# Patient Record
Sex: FEMALE | Race: WHITE
[De-identification: names, ages, dates, MRNs, and addresses within clinical notes are randomized per-mention and may not be internally consistent; named-entity substitution may affect disease eponyms.]

---

## 2017-05-09 ENCOUNTER — HOSPITAL ENCOUNTER (OUTPATIENT)
Dept: HOSPITAL 56 - MW.DI | Age: 62
End: 2017-05-09
Attending: NURSE PRACTITIONER
Payer: COMMERCIAL

## 2017-05-09 DIAGNOSIS — R59.1: ICD-10-CM

## 2017-05-09 DIAGNOSIS — E27.8: ICD-10-CM

## 2017-05-09 DIAGNOSIS — R91.8: ICD-10-CM

## 2017-05-09 DIAGNOSIS — C34.92: Primary | ICD-10-CM

## 2017-05-09 PROCEDURE — 74177 CT ABD & PELVIS W/CONTRAST: CPT

## 2017-05-09 PROCEDURE — 71260 CT THORAX DX C+: CPT

## 2017-05-10 NOTE — CT
EXAMINATION: CT chest, abdomen, and pelvis with contrast

 

HISTORY: Malignant neoplasm

 

COMPARISON: 2/23/2017, 7/18/2016

 

TECHNIQUE: Axial CT images obtained through the chest, abdomen, and pelvis following the administrat
ion of 100 mL of Isovue-370 in the right antecubital fossa.

 

FINDINGS: 

Chest: There is likely a 1 cm nodule within the left thyroid lobe, grossly unchanged. There is a ana
cified granuloma within the right apex. There are small subtle reticular to groundglass nodules with
in the left upper lobe.  There is a new tiny nodule area likely scarring within the left lingula avril
suring 5 x 1 cm. There is a tubular shaped nodule also noted within the left lingula, grossly unchan
ged at 1.6 x 3.7 cm. No pleural effusion or pneumothorax. There is a left-sided mary catheter noted
. The heart is normal in size without a pericardial effusion. The thoracic aorta is normal in calibe
r. There are at least 2 segmental pulmonary artery noted within the right lower lobe.

 

Abdomen: Tiny hepatic hypodensities are noted. There is a cyst within the spleen. There is also a ri
ng-enhancing 1.5 cm area within the spleen possibly heterogeneity and enhancement and appears presen
t in the prior CTs. A 2.2 x 1.7 cm right adrenal nodule, stable. There is no bulky retroperitoneal l
ymphadenopathy. No abdominal ascites. The kidneys enhance and function symmetrically without evidenc
e of obstructive uropathy. The gallbladder and pancreas appear normal.

 

Pelvis: The large and small bowel are normal in caliber without evidence of obstruction. No pericolo
sang inflammation or stranding. The appendix appears normal. No bulky pelvic lymphadenopathy or free 
pelvic fluid. The urinary bladder appears normal. Uterus and ovaries are unremarkable.

 

No suspicious osseous abnormalities identified.

 

IMPRESSION: 

1. Stable 1.6 x 3.7 cm nodule within the left upper lobe.

2. Tiny reticular nodules within the left apex, grossly unchanged to slightly less prominent.

3. New 6 x 11 mm nodular area within the left lingula with increased adjacent atelectasis, likely po
stradiation changes.

4. Stable right adrenal nodule.

5. No mediastinal or hilar lymphadenopathy.

6. 2 small segmental pulmonary emboli within the right lower lobe.

## 2017-07-06 ENCOUNTER — HOSPITAL ENCOUNTER (INPATIENT)
Dept: HOSPITAL 56 - MW.MS | Age: 62
LOS: 4 days | Discharge: HOME | DRG: 469 | End: 2017-07-10
Attending: INTERNAL MEDICINE | Admitting: INTERNAL MEDICINE
Payer: COMMERCIAL

## 2017-07-06 DIAGNOSIS — R13.10: ICD-10-CM

## 2017-07-06 DIAGNOSIS — C34.90: ICD-10-CM

## 2017-07-06 DIAGNOSIS — E03.9: ICD-10-CM

## 2017-07-06 DIAGNOSIS — C79.31: ICD-10-CM

## 2017-07-06 DIAGNOSIS — Z86.711: ICD-10-CM

## 2017-07-06 DIAGNOSIS — N30.01: ICD-10-CM

## 2017-07-06 DIAGNOSIS — N17.9: Primary | ICD-10-CM

## 2017-07-06 DIAGNOSIS — Z79.899: ICD-10-CM

## 2017-07-06 DIAGNOSIS — I10: ICD-10-CM

## 2017-07-06 DIAGNOSIS — R11.10: ICD-10-CM

## 2017-07-06 DIAGNOSIS — D64.81: ICD-10-CM

## 2017-07-06 DIAGNOSIS — Z86.718: ICD-10-CM

## 2017-07-06 DIAGNOSIS — F17.200: ICD-10-CM

## 2017-07-06 LAB
CHLORIDE SERPL-SCNC: 106 MMOL/L (ref 98–110)
SODIUM SERPL-SCNC: 140 MMOL/L (ref 136–146)

## 2017-07-06 PROCEDURE — C9113 INJ PANTOPRAZOLE SODIUM, VIA: HCPCS

## 2017-07-06 RX ADMIN — CEFTRIAXONE SCH MLS/HR: 1 INJECTION, SOLUTION INTRAVENOUS at 16:15

## 2017-07-06 RX ADMIN — HEPARIN SODIUM SCH MLS/HR: 5000 INJECTION, SOLUTION INTRAVENOUS at 18:49

## 2017-07-06 RX ADMIN — SODIUM CHLORIDE SCH MLS/HR: 9 INJECTION, SOLUTION INTRAMUSCULAR; INTRAVENOUS; SUBCUTANEOUS at 14:33

## 2017-07-06 NOTE — PCM.HP
H&P History of Present Illness





- General


Date of Service: 07/06/17


Admit Problem/Dx: 


 Admission Diagnosis/Problem





Admission Diagnosis/Problem      Acute renal failure








Source of Information: Patient, Family (at bedside)


History Limitations: Reports: No Limitations





- History of Present Illness


Initial Comments - Free Text/Narative: 


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 

with recent diagnoses of PE and DVT presented to Oncology today for usual 

Opdivo infusion. She had blood work drawn yesterday in preparation for today's 

infusion. BUN/Cr were noted to be elevated 83 and 6.0. She was directly 

admitted. She reports she has been feeling overall ok the last few days. Her 

last chemotherapy was 2 weeks ago. She was transitioned to Xarelto from Lovenox 

beginning of June for PE/DVTs. She has not noticed any black or bloody bowel 

movements, no hematemesis or coffee ground emesis. She denies chest pain, SOB 

or palpitations, no fevers at home. She may not be eating or drinking as much 

as usual, but has been urinating normally. Urine is not bloody and is usually 

clear in color, not dark. She and family feels she has been at her baseline 

health recently





Todays lab reveals WBC 8.40 hgb 9.9, Hct 31, BUN 79 and Cr 5.9 FeNA 3.43. UA 

reveals few bacteria, 15-20 WBC, moderate leukocyte esterase. 








- Related Data


Allergies/Adverse Reactions: 


 Allergies











Allergy/AdvReac Type Severity Reaction Status Date / Time


 


No Known Allergies Allergy   Verified 07/09/16 18:15











Home Medications: 


 Home Meds





LORazepam 0.5 mg PO Q6HR PRN 05/04/16 [History]


Carvedilol [Carvedilol] 6.25 mg PO BID 01/09/17 [History]


Albuterol Sulfate [Proair Respiclick] 2 inh IH Q6H PRN 07/06/17 [History]


Benzonatate [Benzonatate] 200 mg PO TID PRN 07/06/17 [History]


Levothyroxine Sodium [Levothyroxine Sodium] 75 mcg PO ACBREAKFAST 07/06/17 [

History]


Magnesium Oxide 800 mg PO TID 07/06/17 [History]


Nivolumab [Opdivo] 240 mg IV Q14D 07/06/17 [History]


Omeprazole 20 mg PO ACBREAKFAST 07/06/17 [History]


Rivaroxaban [Xarelto] 20 mg PO DAILY@1900 07/06/17 [History]











Past Medical History


HEENT History: Reports: Impaired Vision


Other HEENT History: wears glasses sometimes


Cardiovascular History: Reports: Hypertension


Other Cardiovascular History: not currently taking medication


Respiratory History: Reports: Other (See Below)


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 

the brain


Gastrointestinal History: Reports: GERD.  Denies: GI Bleed


OB/GYN History: Reports: Pregnancy


Musculoskeletal History: Reports: Fracture


Other Musculoskeletal History: currently has fx left ankle, casted          has 

left side weakness


Neurological History: Reports: Vertigo, Other (See Below)


Other Neuro History: dizziness


Psychiatric History: Reports: None


Endocrine/Metabolic History: Reports: Hypothyroidism


Hematologic History: Reports: None


Other Hematologic History: son states her blood has been slow to clot recently


Immunologic History: Reports: None


Oncologic (Cancer) History: Reports: Lung


Other Oncologic History: lung cancer is metastisized to brain


Dermatologic History: Reports: None





- Infectious Disease History


Infectious Disease History: Reports: Chicken Pox, Shingles





- Past Surgical History


Head Surgeries/Procedures: Reports: None


Oncologic Surgical History: Reports: Other (See Below)





Social & Family History





- Family History


Family Medical History: Noncontributory





- Tobacco Use


Smoking Status *Q: Current Some Day Smoker


Years of Tobacco use: 40


Packs/Tins Daily: 0.2


Used Tobacco, but Quit: Yes


Second Hand Smoke Exposure: No





- Caffeine Use


Caffeine Use: Reports: Coffee





- Recreational Drug Use


Recreational Drug Use: No





- Living Situation & Occupation


Living situation: Reports: , with Family





H&P Review of Systems





- Review of Systems:


Review Of Systems: See Below


General: Reports: No Symptoms.  Denies: Fever, Chills, Malaise, Fatigue


HEENT: Reports: No Symptoms.  Denies: Headaches, Sinus Congestion, Sore Throat


Pulmonary: Reports: Cough (baseline).  Denies: Shortness of Breath


Cardiovascular: Reports: No Symptoms.  Denies: Chest Pain, Palpitations, Edema, 

Lightheadedness


Gastrointestinal: Reports: Nausea (intermittently), Vomiting.  Denies: 

Abdominal Pain, Black Stool, Bloody Stool, Hematemesis


Genitourinary: Reports: No Symptoms.  Denies: Dysuria, Frequency, Burning, 

Incontinence, Hematuria


Skin: Reports: No Symptoms


Neurological: Reports: No Symptoms


Hematologic/Lymphatic: Reports: No Symptoms


Immunologic: Reports: No Symptoms





Exam





- Exam


Exam: See Below





- Vital Signs


Vital Signs: 


 Last Vital Signs











Temp  97.4 F   07/06/17 13:26


 


Pulse  62   07/06/17 13:26


 


Resp  18   07/06/17 13:26


 


BP  110/62   07/06/17 13:26


 


Pulse Ox  92 L  07/06/17 13:26











Weight: 70.5 kg





- Exam


General: Alert, Oriented, Cooperative


HEENT: Conjunctiva Clear, Mucosa Moist & Pink, Posterior Pharynx Clear


Neck: Supple, Trachea Midline, Full Range of Motion.  No: Lymphadenopathy


Lungs: Clear to Auscultation, Normal Respiratory Effort


Cardiovascular: Regular Rate, Regular Rhythm, Normal S1, Normal S2.  No: 

Irregular Rhythm, Tachycardia, Systolic Murmur


Abdomen: Normal Bowel Sounds, Soft.  No: Distention, Guarding, Rigidity, 

Tenderness


Back Exam: Normal Inspection


Extremities: Normal Inspection, Normal Pulses.  No: Calf Tenderness, Edema


Neurological: Cranial Nerves Intact


Neuro Extensive - Mental Status: Alert, Oriented x3, Normal Mood/Affect, Normal 

Cognition


Neuro Extensive - Motor, Sensory, Reflexes: CN II-XII Intact


Psychiatric: Alert, Normal Affect, Normal Mood





- Patient Data


Lab Results Last 24 hrs: 


 Laboratory Results - last 24 hr











  07/06/17 07/06/17 07/06/17 Range/Units





  14:10 14:10 14:20 


 


WBC     (4.0-11.0)  K/uL


 


RBC     (4.30-5.90)  M/uL


 


Hgb     (12.0-16.0)  g/dL


 


Hct     (36.0-46.0)  %


 


MCV     (80.0-98.0)  fL


 


MCH     (27.0-32.0)  pg


 


MCHC     (31.0-37.0)  g/dL


 


RDW Std Deviation     (28.0-62.0)  fl


 


RDW Coeff of Castillo     (11.0-15.0)  %


 


Plt Count     (150-400)  K/uL


 


MPV     (7.40-12.00)  fL


 


Add Manual Diff     


 


Neutrophils % (Manual)     (48.0-80.0)  %


 


Band Neutrophils %     %


 


Lymphocytes % (Manual)     (16.0-40.0)  %


 


Monocytes % (Manual)     (0.0-15.0)  %


 


Eosinophils % (Manual)     (0.0-7.0)  %


 


Basophils % (Manual)     (0.0-1.5)  %


 


Nucleated RBC %     /100WBC


 


Absolute Seg Neuts     


 


Band Neutrophils #     


 


Lymphocytes # (Manual)     


 


Monocytes # (Manual)     


 


Eosinophils # (Manual)     


 


Basophils # (Manual)     


 


Nucleated RBCs #     K/uL


 


APTT     (18.6-31.3)  SEC


 


Sodium    140  (136-146)  mmol/L


 


Potassium    4.4  (3.5-5.1)  mmol/L


 


Chloride    106  ()  mmol/L


 


Carbon Dioxide    24  (21-31)  mmol/L


 


BUN    79 H  (6.0-23.0)  mg/dL


 


Creatinine    5.9 H  (0.6-1.5)  mg/dL


 


Est Cr Clr Drug Dosing    8.38  mL/min


 


Estimated GFR (MDRD)    7.2  ml/min


 


Glucose    98  ()  mg/dL


 


Calcium    9.3  (8.8-10.8)  mg/dL


 


Urine Color   YELLOW   


 


Urine Appearance   HAZY   


 


Urine pH   6.5   (5.0-8.0)  


 


Ur Specific Gravity   1.010   (1.001-1.035)  


 


Urine Protein   NEGATIVE   (NEGATIVE)  mg/dL


 


Urine Glucose (UA)   NEGATIVE   (NEGATIVE)  mg/dL


 


Urine Ketones   NEGATIVE   (NEGATIVE)  mg/dL


 


Urine Occult Blood   MODERATE   (NEGATIVE)  


 


Urine Nitrite   NEGATIVE   (NEGATIVE)  


 


Urine Bilirubin   NEGATIVE   (NEGATIVE)  


 


Urine Urobilinogen   0.2   (<2.0)  EU/dL


 


Ur Leukocyte Esterase   MODERATE   (NEGATIVE)  


 


Ur Random Creatinine  74.9    mg/dL


 


Ur Random Sodium  61.0    mmol/L














  07/06/17 07/06/17 Range/Units





  14:20 14:20 


 


WBC  8.40   (4.0-11.0)  K/uL


 


RBC  3.43 L   (4.30-5.90)  M/uL


 


Hgb  9.9 L   (12.0-16.0)  g/dL


 


Hct  31.5 L   (36.0-46.0)  %


 


MCV  91.8   (80.0-98.0)  fL


 


MCH  28.9   (27.0-32.0)  pg


 


MCHC  31.4   (31.0-37.0)  g/dL


 


RDW Std Deviation  48.2   (28.0-62.0)  fl


 


RDW Coeff of Castillo  14   (11.0-15.0)  %


 


Plt Count  382   (150-400)  K/uL


 


MPV  8.70   (7.40-12.00)  fL


 


Add Manual Diff  YES   


 


Neutrophils % (Manual)  72   (48.0-80.0)  %


 


Band Neutrophils %  3   %


 


Lymphocytes % (Manual)  16   (16.0-40.0)  %


 


Monocytes % (Manual)  2   (0.0-15.0)  %


 


Eosinophils % (Manual)  6   (0.0-7.0)  %


 


Basophils % (Manual)  1   (0.0-1.5)  %


 


Nucleated RBC %  0.0   /100WBC


 


Absolute Seg Neuts  6.0   


 


Band Neutrophils #  0.3   


 


Lymphocytes # (Manual)  1.3   


 


Monocytes # (Manual)  0.2   


 


Eosinophils # (Manual)  0.5   


 


Basophils # (Manual)  0   


 


Nucleated RBCs #  0   K/uL


 


APTT   48.1 H  (18.6-31.3)  SEC


 


Sodium    (136-146)  mmol/L


 


Potassium    (3.5-5.1)  mmol/L


 


Chloride    ()  mmol/L


 


Carbon Dioxide    (21-31)  mmol/L


 


BUN    (6.0-23.0)  mg/dL


 


Creatinine    (0.6-1.5)  mg/dL


 


Est Cr Clr Drug Dosing    mL/min


 


Estimated GFR (MDRD)    ml/min


 


Glucose    ()  mg/dL


 


Calcium    (8.8-10.8)  mg/dL


 


Urine Color    


 


Urine Appearance    


 


Urine pH    (5.0-8.0)  


 


Ur Specific Gravity    (1.001-1.035)  


 


Urine Protein    (NEGATIVE)  mg/dL


 


Urine Glucose (UA)    (NEGATIVE)  mg/dL


 


Urine Ketones    (NEGATIVE)  mg/dL


 


Urine Occult Blood    (NEGATIVE)  


 


Urine Nitrite    (NEGATIVE)  


 


Urine Bilirubin    (NEGATIVE)  


 


Urine Urobilinogen    (<2.0)  EU/dL


 


Ur Leukocyte Esterase    (NEGATIVE)  


 


Ur Random Creatinine    mg/dL


 


Ur Random Sodium    mmol/L











Result Diagrams: 


 07/06/17 14:20





 07/06/17 14:20





*Q Meaningful Use (ADM)





- VTE *Q


VTE Criteria *Q: 








- VTE Risk Assess *Q


Each Risk Factor Represents 1 Point: None


Total Score 1 Point Risk Factors: 0


Each Risk Factor Represents 2 Points: None


Total Score 2 Point Risk Factors: 0


Each Risk Factor Represents 3 Points: History Superficial Venous Thrombosis, 

DVT or PE, Present Cancer or Chemotherapy


Total Score 3 Point Risk Factors: 6


Each Risk Factor Represents 5 Points: None


Total Score 5 Point Risk Factors: 0


Venous Thromboembolism Risk Factor Score *Q: 6





- Stroke *Q


Stroke Criteria *Q: 








- AMI *Q


AMI Criteria *Q: 








- Problem List


(1) Acute renal injury


SNOMED Code(s): 03815980


   ICD Code: N17.9 - ACUTE KIDNEY FAILURE, UNSPECIFIED   Status: Acute   

Current Visit: Yes   





(2) UTI (urinary tract infection)


SNOMED Code(s): 11940909


   ICD Code: N39.0 - URINARY TRACT INFECTION, SITE NOT SPECIFIED   Status: 

Acute   Current Visit: Yes   


Qualifiers: 


   Urinary tract infection type: acute cystitis   Hematuria presence: with 

hematuria   Qualified Code(s): N30.01 - Acute cystitis with hematuria   





(3) Small cell lung cancer


SNOMED Code(s): 533263516


   ICD Code: C34.90 - MALIGNANT NEOPLASM OF UNSP PART OF UNSP BRONCHUS OR LUNG 

  Status: Chronic   Current Visit: Yes   


Qualifiers: 


   Laterality: unspecified laterality   Qualified Code(s): C34.90 - Malignant 

neoplasm of unspecified part of unspecified bronchus or lung   





(4) Hx pulmonary embolism


SNOMED Code(s): 633696261


   ICD Code: Z86.711 - PERSONAL HISTORY OF PULMONARY EMBOLISM   Status: Chronic

   Current Visit: Yes   





(5) Hx of deep venous thrombosis


SNOMED Code(s): 198345142


   ICD Code: Z86.718 - PERSONAL HISTORY OF OTHER VENOUS THROMBOSIS AND EMBOLISM

   Status: Chronic   Current Visit: Yes   


Problem List Initiated/Reviewed/Updated: Yes


Orders Last 24hrs: 


 Active Orders 24 hr











 Category Date Time Status


 


 Patient Status [ADT] Routine ADT  07/06/17 13:26 Active


 


 Cardiac Monitoring [RC] Q8H Care  07/06/17 13:26 Active


 


 Insert Urinary Catheter [OM.PC] Q24H Care  07/06/17 13:30 Ordered


 


 Intake and Output Strict [RC] Q12H Care  07/06/17 13:26 Active


 


 Oxygen Therapy [RC] PRN Care  07/06/17 13:26 Active


 


 Telemetry Monitoring [Cardiac Monitoring] [RC] .AS Care  07/06/17 15:10 Active





 DIRECTED   


 


 Up With Assistance [RC] ASDIRECTED Care  07/06/17 13:26 Active


 


 Urinary Catheter Assessment [RC] ASDIRECTED Care  07/06/17 13:33 Active


 


 VTE/DVT Education [RC] PER UNIT ROUTINE Care  07/06/17 13:26 Active


 


 Vital Signs [RC] Q4H Care  07/06/17 13:26 Active


 


 Regular Diet [DIET] Diet  07/06/17 Dinner Active


 


 Retroperitoneal Comp [US] Routine Exams  07/06/17 13:26 Ordered


 


 BMP [BASIC METABOLIC PANEL,BMP] [CHEM] DAILY Lab  07/07/17 05:00 Ordered


 


 BMP [BASIC METABOLIC PANEL,BMP] [CHEM] DAILY Lab  07/08/17 05:00 Ordered


 


 BMP [BASIC METABOLIC PANEL,BMP] [CHEM] DAILY Lab  07/09/17 05:00 Ordered


 


 CBC WITH AUTO DIFF [HEME] DAILY Lab  07/07/17 05:00 Ordered


 


 CBC WITH AUTO DIFF [HEME] DAILY Lab  07/08/17 05:00 Ordered


 


 CBC WITH AUTO DIFF [HEME] DAILY Lab  07/09/17 05:00 Ordered


 


 PTT,PARTIAL THROMBOPLSTIN TIME [COAG] Q6H Lab  07/07/17 01:00 Ordered


 


 PTT,PARTIAL THROMBOPLSTIN TIME [COAG] Q6H Lab  07/07/17 07:00 Ordered


 


 PTT,PARTIAL THROMBOPLSTIN TIME [COAG] Q6H Lab  07/07/17 13:00 Ordered


 


 PTT,PARTIAL THROMBOPLSTIN TIME [COAG] Q6H Lab  07/07/17 19:00 Ordered


 


 PTT,PARTIAL THROMBOPLSTIN TIME [COAG] Q6H Lab  07/08/17 01:00 Ordered


 


 PTT,PARTIAL THROMBOPLSTIN TIME [COAG] Q6H Lab  07/08/17 07:00 Ordered


 


 UA W/MICROSCOPIC [URIN] Routine Lab  07/06/17 14:10 Results


 


 Heparin Sod,Pork In 0.45% Nacl [Heparin-1/2Ns 25,000 Med  07/06/17 19:00 Active





 Units/500]   





 25,000 unit in 500 ml IV TITRATE   


 


 Heparin Sodium Med  07/07/17 01:00 Active





 0 units IVPUSH Q6H PRN   


 


 Pantoprazole [ProTONIX IV***] 40 mg Med  07/06/17 13:30 Active





 Sodium Chloride 0.9% [Normal Saline] 10 ml   





 IVPUSH DAILY   


 


 Sodium Chloride 0.9% [Normal Saline] 1,000 ml Med  07/06/17 13:30 Active





 IV .BOLUS   


 


 Sodium Chloride 0.9% [Normal Saline] 1,000 ml Med  07/06/17 15:45 Ordered





 IV ASDIRECTED   


 


 Resuscitation Status Routine Resus Stat  07/06/17 15:10 Ordered








 Medication Orders





Heparin Sodium (Porcine) (Heparin Sodium)  0 units IVPUSH Q6H PRN


   PRN Reason: APPT RESULTS


Pantoprazole Sodium 40 mg/ (Sodium Chloride)  10 mls @ 300 mls/hr IVPUSH DAILY 

ZULY


   Last Admin: 07/06/17 14:33  Dose: 300 mls/hr


Sodium Chloride (Normal Saline)  1,000 mls @ 999 mls/hr IV .BOLUS ZULY


Heparin Sod,Pork In 0.45% Nacl (Heparin-1/2ns 25,000 Units/500)  25,000 unit in 

500 mls @ 25.2 mls/hr IV TITRATE ZULY; 18 UNITS/KG/HR


   PRN Reason: Protocol


Sodium Chloride (Normal Saline)  1,000 mls @ 125 mls/hr IV ASDIRECTED ZULY








Assessment/Plan Comment:: 


This 62 year old female admitted with KYLE and UTI 





1. KYLE: ATN vs exposure to nephrotoxic chemo agent, Opdivo?? FeNA 3.43. Have 

given 2 L NS bolus on admit, will continue  overnight, check BMP in am. 

Monitor strict I/O with indwelling catheter. 





2. UTI: UC pending, will place on Rocephin for now. Monitor.





3. HX DVT/PE: Will hold Xarelto due to renal injury. Place on Heparin gtt. 

Monitor PTTs.





4. 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

## 2017-07-06 NOTE — US
EXAMINATION: Renal ultrasound

 

HISTORY: Acute renal injury

 

COMPARISON: CT dated 5/9/2017 and CT dated 7/18/2016

 

TECHNIQUE: Grayscale and color Doppler images obtained of the kidneys and bladder. 

 

FINDINGS: The right kidney measures at least 11.6 cm and the left kidney measures at least 12 cm arron
e-to-pole without evidence of hydronephrosis. The renal cortical echotexture appears increased bilat
erally. There is a mildly complex 1.2 cm cyst within the right kidney which appears stable on compar
mary CTs to at least July 2016. Normal color Doppler flow bilaterally. Incidental gallstones are not
ed within the gallbladder.

 

There is a Carbone catheter within the bladder

 

IMPRESSION: 

1. Increased renal cortical echotexture, correlate for a medical renal disease.

2. 1.2 cm mildly complex cyst within the right kidney. Stable in comparison to previous CTs, conside
r 6 month one year follow-up.

3. Cholelithiasis.

## 2017-07-07 LAB
CHLORIDE SERPL-SCNC: 115 MMOL/L (ref 98–110)
SODIUM SERPL-SCNC: 143 MMOL/L (ref 136–146)

## 2017-07-07 RX ADMIN — SODIUM CHLORIDE SCH MLS/HR: 9 INJECTION, SOLUTION INTRAMUSCULAR; INTRAVENOUS; SUBCUTANEOUS at 09:30

## 2017-07-07 RX ADMIN — CEFTRIAXONE SCH MLS/HR: 1 INJECTION, SOLUTION INTRAVENOUS at 15:15

## 2017-07-07 RX ADMIN — HEPARIN SODIUM SCH MLS/HR: 5000 INJECTION, SOLUTION INTRAVENOUS at 19:02

## 2017-07-07 NOTE — PCM.PN
- General Info


Date of Service: 07/07/17


Admission Dx/Problem (Free Text): 


 Admission Diagnosis/Problem





Admission Diagnosis/Problem      Acute renal failure








Subjective Update: 


Feeling good today, no complaints. Still having issues will swallowing and then 

vomiting intermittently, mainly with more solid foods. No chest pain or SOB, no 

palpitations. 





Functional Status: Reports: pain controlled, tolerating diet, ambulating





- Review of Systems


General: Reports: No Symptoms


HEENT: Reports: other (trouble keeping solid foods down, "its like it reaches a 

point and then comes back up").  Denies: sore throat


Pulmonary: Denies: shortness of breath


Cardiovascular: Reports: No Symptoms.  Denies: Chest Pain, Edema


Gastrointestinal: Reports: No symptoms.  Denies: Abdominal pain, Nausea, 

Vomiting


Genitourinary: Reports: no symptoms


Musculoskeletal: Reports: no symptoms


Neurological: Reports: No Symptoms


Psychiatric: Reports: no symptoms





- Patient Data


Vitals - most recent: 


 Last Vital Signs











Temp  97.6 F   07/07/17 04:00


 


Pulse  66   07/07/17 04:00


 


Resp  16   07/07/17 04:00


 


BP  90/56 L  07/07/17 04:00


 


Pulse Ox  95   07/07/17 04:00











Weight - most recent: 70.5 kg


I&O - last 24 hours: 


 Intake & Output











 07/06/17 07/07/17 07/07/17





 22:59 06:59 14:59


 


Intake Total 2528 1450 


 


Output Total 250 1750 


 


Balance 2278 -300 











Lab Results last 24 hrs: 


 Laboratory Results - last 24 hr











  07/06/17 07/06/17 07/06/17 Range/Units





  14:10 14:10 14:20 


 


WBC     (4.0-11.0)  K/uL


 


RBC     (4.30-5.90)  M/uL


 


Hgb     (12.0-16.0)  g/dL


 


Hct     (36.0-46.0)  %


 


MCV     (80.0-98.0)  fL


 


MCH     (27.0-32.0)  pg


 


MCHC     (31.0-37.0)  g/dL


 


RDW Std Deviation     (28.0-62.0)  fl


 


RDW Coeff of Castillo     (11.0-15.0)  %


 


Plt Count     (150-400)  K/uL


 


MPV     (7.40-12.00)  fL


 


Add Manual Diff     


 


Neutrophils % (Manual)     (48.0-80.0)  %


 


Band Neutrophils %     %


 


Lymphocytes % (Manual)     (16.0-40.0)  %


 


Monocytes % (Manual)     (0.0-15.0)  %


 


Eosinophils % (Manual)     (0.0-7.0)  %


 


Basophils % (Manual)     (0.0-1.5)  %


 


Nucleated RBC %     /100WBC


 


Absolute Seg Neuts     


 


Band Neutrophils #     


 


Lymphocytes # (Manual)     


 


Monocytes # (Manual)     


 


Eosinophils # (Manual)     


 


Basophils # (Manual)     


 


Nucleated RBCs #     K/uL


 


APTT     (18.6-31.3)  SEC


 


Sodium    140  (136-146)  mmol/L


 


Potassium    4.4  (3.5-5.1)  mmol/L


 


Chloride    106  ()  mmol/L


 


Carbon Dioxide    24  (21-31)  mmol/L


 


BUN    79 H  (6.0-23.0)  mg/dL


 


Creatinine    5.9 H  (0.6-1.5)  mg/dL


 


Est Cr Clr Drug Dosing    8.38  mL/min


 


Estimated GFR (MDRD)    7.2  ml/min


 


Glucose    98  ()  mg/dL


 


Calcium    9.3  (8.8-10.8)  mg/dL


 


Urine Color   YELLOW   


 


Urine Appearance   HAZY   


 


Urine pH   6.5   (5.0-8.0)  


 


Ur Specific Gravity   1.010   (1.001-1.035)  


 


Urine Protein   NEGATIVE   (NEGATIVE)  mg/dL


 


Urine Glucose (UA)   NEGATIVE   (NEGATIVE)  mg/dL


 


Urine Ketones   NEGATIVE   (NEGATIVE)  mg/dL


 


Urine Occult Blood   MODERATE   (NEGATIVE)  


 


Urine Nitrite   NEGATIVE   (NEGATIVE)  


 


Urine Bilirubin   NEGATIVE   (NEGATIVE)  


 


Urine Urobilinogen   0.2   (<2.0)  EU/dL


 


Ur Leukocyte Esterase   MODERATE   (NEGATIVE)  


 


Urine RBC   2-4   (0-2/HPF)  


 


Urine WBC   15-20   (0-5/HPF)  


 


Ur Epithelial Cells   FEW   (NONE-FEW)  


 


Urine Bacteria   FEW   (NEGATIVE)  


 


Ur Random Creatinine  74.9    mg/dL


 


Ur Random Sodium  61.0    mmol/L














  07/06/17 07/06/17 07/07/17 Range/Units





  14:20 14:20 00:50 


 


WBC  8.40    (4.0-11.0)  K/uL


 


RBC  3.43 L    (4.30-5.90)  M/uL


 


Hgb  9.9 L    (12.0-16.0)  g/dL


 


Hct  31.5 L    (36.0-46.0)  %


 


MCV  91.8    (80.0-98.0)  fL


 


MCH  28.9    (27.0-32.0)  pg


 


MCHC  31.4    (31.0-37.0)  g/dL


 


RDW Std Deviation  48.2    (28.0-62.0)  fl


 


RDW Coeff of Castillo  14    (11.0-15.0)  %


 


Plt Count  382    (150-400)  K/uL


 


MPV  8.70    (7.40-12.00)  fL


 


Add Manual Diff  YES    


 


Neutrophils % (Manual)  72    (48.0-80.0)  %


 


Band Neutrophils %  3    %


 


Lymphocytes % (Manual)  16    (16.0-40.0)  %


 


Monocytes % (Manual)  2    (0.0-15.0)  %


 


Eosinophils % (Manual)  6    (0.0-7.0)  %


 


Basophils % (Manual)  1    (0.0-1.5)  %


 


Nucleated RBC %  0.0    /100WBC


 


Absolute Seg Neuts  6.0    


 


Band Neutrophils #  0.3    


 


Lymphocytes # (Manual)  1.3    


 


Monocytes # (Manual)  0.2    


 


Eosinophils # (Manual)  0.5    


 


Basophils # (Manual)  0    


 


Nucleated RBCs #  0    K/uL


 


APTT   48.1 H  105.9 H  (18.6-31.3)  SEC


 


Sodium     (136-146)  mmol/L


 


Potassium     (3.5-5.1)  mmol/L


 


Chloride     ()  mmol/L


 


Carbon Dioxide     (21-31)  mmol/L


 


BUN     (6.0-23.0)  mg/dL


 


Creatinine     (0.6-1.5)  mg/dL


 


Est Cr Clr Drug Dosing     mL/min


 


Estimated GFR (MDRD)     ml/min


 


Glucose     ()  mg/dL


 


Calcium     (8.8-10.8)  mg/dL


 


Urine Color     


 


Urine Appearance     


 


Urine pH     (5.0-8.0)  


 


Ur Specific Gravity     (1.001-1.035)  


 


Urine Protein     (NEGATIVE)  mg/dL


 


Urine Glucose (UA)     (NEGATIVE)  mg/dL


 


Urine Ketones     (NEGATIVE)  mg/dL


 


Urine Occult Blood     (NEGATIVE)  


 


Urine Nitrite     (NEGATIVE)  


 


Urine Bilirubin     (NEGATIVE)  


 


Urine Urobilinogen     (<2.0)  EU/dL


 


Ur Leukocyte Esterase     (NEGATIVE)  


 


Urine RBC     (0-2/HPF)  


 


Urine WBC     (0-5/HPF)  


 


Ur Epithelial Cells     (NONE-FEW)  


 


Urine Bacteria     (NEGATIVE)  


 


Ur Random Creatinine     mg/dL


 


Ur Random Sodium     mmol/L














  07/07/17 07/07/17 Range/Units





  07:18 07:18 


 


WBC   5.69  (4.0-11.0)  K/uL


 


RBC   2.99 L  (4.30-5.90)  M/uL


 


Hgb   8.7 L  (12.0-16.0)  g/dL


 


Hct   27.5 L  (36.0-46.0)  %


 


MCV   92.0  (80.0-98.0)  fL


 


MCH   29.1  (27.0-32.0)  pg


 


MCHC   31.6  (31.0-37.0)  g/dL


 


RDW Std Deviation   47.7  (28.0-62.0)  fl


 


RDW Coeff of Castillo   14  (11.0-15.0)  %


 


Plt Count   278  (150-400)  K/uL


 


MPV   8.30  (7.40-12.00)  fL


 


Add Manual Diff   YES  


 


Neutrophils % (Manual)   62  (48.0-80.0)  %


 


Band Neutrophils %   3  %


 


Lymphocytes % (Manual)   14 L  (16.0-40.0)  %


 


Monocytes % (Manual)   13  (0.0-15.0)  %


 


Eosinophils % (Manual)   8 H  (0.0-7.0)  %


 


Basophils % (Manual)    (0.0-1.5)  %


 


Nucleated RBC %   0.0  /100WBC


 


Absolute Seg Neuts   3.5  


 


Band Neutrophils #   0.2  


 


Lymphocytes # (Manual)   0.8  


 


Monocytes # (Manual)   0.7  


 


Eosinophils # (Manual)   0.5  


 


Basophils # (Manual)    


 


Nucleated RBCs #   0  K/uL


 


APTT  130.5 H   (18.6-31.3)  SEC


 


Sodium    (136-146)  mmol/L


 


Potassium    (3.5-5.1)  mmol/L


 


Chloride    ()  mmol/L


 


Carbon Dioxide    (21-31)  mmol/L


 


BUN    (6.0-23.0)  mg/dL


 


Creatinine    (0.6-1.5)  mg/dL


 


Est Cr Clr Drug Dosing    mL/min


 


Estimated GFR (MDRD)    ml/min


 


Glucose    ()  mg/dL


 


Calcium    (8.8-10.8)  mg/dL


 


Urine Color    


 


Urine Appearance    


 


Urine pH    (5.0-8.0)  


 


Ur Specific Gravity    (1.001-1.035)  


 


Urine Protein    (NEGATIVE)  mg/dL


 


Urine Glucose (UA)    (NEGATIVE)  mg/dL


 


Urine Ketones    (NEGATIVE)  mg/dL


 


Urine Occult Blood    (NEGATIVE)  


 


Urine Nitrite    (NEGATIVE)  


 


Urine Bilirubin    (NEGATIVE)  


 


Urine Urobilinogen    (<2.0)  EU/dL


 


Ur Leukocyte Esterase    (NEGATIVE)  


 


Urine RBC    (0-2/HPF)  


 


Urine WBC    (0-5/HPF)  


 


Ur Epithelial Cells    (NONE-FEW)  


 


Urine Bacteria    (NEGATIVE)  


 


Ur Random Creatinine    mg/dL


 


Ur Random Sodium    mmol/L











Med Orders - Current: 


 Current Medications





Albuterol (Ventolin Hfa)  8 gm INH Q6H PRN


   PRN Reason: Shortness of Breath


Albuterol/Ipratropium (Duoneb 3.0-0.5 Mg/3 Ml)  3 ml NEB Q4HRRT PRN


   PRN Reason: SOB/wheezing


Benzonatate (Tessalon Perles)  200 mg PO TID PRN


   PRN Reason: Cough


Heparin Sodium (Porcine) (Heparin Sodium)  0 units IVPUSH Q6H PRN


   PRN Reason: APPT RESULTS


Pantoprazole Sodium 40 mg/ (Sodium Chloride)  10 mls @ 300 mls/hr IVPUSH DAILY 

ZULY


   Last Admin: 07/06/17 14:33 Dose:  300 mls/hr


Sodium Chloride (Normal Saline)  1,000 mls @ 999 mls/hr IV .BOLUS ZULY


   Last Admin: 07/06/17 15:54 Dose:  999 mls/hr


Heparin Sod,Pork In 0.45% Nacl (Heparin-1/2ns 25,000 Units/500)  25,000 unit in 

500 mls @ 25.2 mls/hr IV TITRATE ZULY; 18 UNITS/KG/HR


   PRN Reason: Protocol


   Last Titration: 07/07/17 02:20 Dose:  15 units/kg/hr, 21 mls/hr


Sodium Chloride (Normal Saline)  1,000 mls @ 125 mls/hr IV ASDIRECTED ZULY


   Last Admin: 07/07/17 01:48 Dose:  125 mls/hr


Ceftriaxone Sodium/Dextrose 1 (gm/ Premix)  50 mls @ 100 mls/hr IV Q24H ZULY


   Last Admin: 07/06/17 16:15 Dose:  100 mls/hr


Levothyroxine Sodium (Levothyroxine)  75 mcg PO ACBREAKFAST ZULY


   Last Admin: 07/07/17 06:54 Dose:  75 mcg


Lorazepam (Ativan)  0.5 mg PO Q6HR PRN


   PRN Reason: Anxiety





Discontinued Medications





Sodium Chloride (Normal Saline)  1,000 mls @ 999 mls/hr IV .Bolus ONE


   Stop: 07/06/17 14:38


   Last Admin: 07/06/17 13:46 Dose:  999 mls/hr











- Exam


Quality Assessment: urine catheter, DVT prophylaxis


General: alert, oriented, cooperative, no acute distress


Neck: supple


Lungs: Clear to auscultation, Normal respiratory effort


Cardiovascular: Regular Rate, Regular Rhythm


Abdomen: bowel sounds present, soft, no tenderness, no distension


Extremities: no edema, normal pulses


Neurological: no new focal deficit


Psy/Mental Status: alert, normal affect, normal mood





- Problem List & Annotations


(1) Acute renal injury


SNOMED Code(s): 27131547


   Code(s): N17.9 - ACUTE KIDNEY FAILURE, UNSPECIFIED   Status: Acute   Current 

Visit: Yes   





(2) UTI (urinary tract infection)


SNOMED Code(s): 66126083


   Code(s): N39.0 - URINARY TRACT INFECTION, SITE NOT SPECIFIED   Status: Acute

   Current Visit: Yes   


Qualifiers: 


   Urinary tract infection type: acute cystitis   Hematuria presence: with 

hematuria   Qualified Code(s): N30.01 - Acute cystitis with hematuria   





(3) Small cell lung cancer


SNOMED Code(s): 326729012


   Code(s): C34.90 - MALIGNANT NEOPLASM OF UNSP PART OF UNSP BRONCHUS OR LUNG   

Status: Chronic   Current Visit: Yes   


Qualifiers: 


   Laterality: unspecified laterality   Qualified Code(s): C34.90 - Malignant 

neoplasm of unspecified part of unspecified bronchus or lung   





(4) Hx pulmonary embolism


SNOMED Code(s): 318651805


   Code(s): Z86.711 - PERSONAL HISTORY OF PULMONARY EMBOLISM   Status: Chronic 

  Current Visit: Yes   





(5) Hx of deep venous thrombosis


SNOMED Code(s): 728446555


   Code(s): Z86.718 - PERSONAL HISTORY OF OTHER VENOUS THROMBOSIS AND EMBOLISM 

  Status: Chronic   Current Visit: Yes   





- Problem List Review


Problem List Initiated/Reviewed/Updated: Yes





- My Orders


Last 24 Hours: 


My Active Orders





07/06/17 13:26


Patient Status [ADT] Routine 


Cardiac Monitoring [RC] Q8H 


Intake and Output Strict [RC] Q12H 


Oxygen Therapy [RC] PRN 


Up With Assistance [RC] ASDIRECTED 


VTE/DVT Education [RC] PER UNIT ROUTINE 


Vital Signs [RC] Q4H 





07/06/17 13:30


Insert Urinary Catheter [OM.PC] Q24H 


Pantoprazole [ProTONIX IV***] 40 mg   Sodium Chloride 0.9% [Normal Saline] 10 

ml IVPUSH DAILY 


Sodium Chloride 0.9% [Normal Saline] 1,000 ml IV .BOLUS 





07/06/17 13:33


Urinary Catheter Assessment [RC] ASDIRECTED 





07/06/17 14:10


CULTURE URINE [RM] Routine 





07/06/17 15:10


Telemetry Monitoring [Cardiac Monitoring] [RC] .AS DIRECTED 


Resuscitation Status Routine 





07/06/17 15:45


Sodium Chloride 0.9% [Normal Saline] 1,000 ml IV ASDIRECTED 





07/06/17 16:00


cefTRIAXone [Rocephin in Dextrose,Iso-Osm 1 GM/50 ML] 1 gm   Premix Bag 1 bag 

IV Q24H 





07/06/17 16:05


Albuterol [Ventolin HFA]   8 gm INH Q6H PRN 


Benzonatate [Tessalon Perles]   200 mg PO TID PRN 


LORazepam [Ativan]   0.5 mg PO Q6HR PRN 





07/06/17 16:08


RT Aerosol Therapy [RC] ASDIRECTED 


Albuterol/Ipratropium [DuoNeb 3.0-0.5 MG/3 ML]   3 ml NEB Q4HRRT PRN 





07/06/17 19:00


Heparin Sod,Pork In 0.45% Nacl [Heparin-1/2Ns 25,000 Units/500] 25,000 unit in 

500 ml IV TITRATE 





07/06/17 Dinner


Regular Diet [DIET] 





07/07/17 01:00


Heparin Sodium   0 units IVPUSH Q6H PRN 





07/07/17 07:18


BMP [BASIC METABOLIC PANEL,BMP] [CHEM] DAILY 





07/07/17 07:30


Levothyroxine   75 mcg PO ACBREAKFAST 





07/07/17 13:00


PTT,PARTIAL THROMBOPLSTIN TIME [COAG] Q6H 





07/07/17 19:00


PTT,PARTIAL THROMBOPLSTIN TIME [COAG] Q6H 





07/08/17 01:00


PTT,PARTIAL THROMBOPLSTIN TIME [COAG] Q6H 





07/08/17 05:00


BMP [BASIC METABOLIC PANEL,BMP] [CHEM] DAILY 


CBC WITH AUTO DIFF [HEME] DAILY 





07/08/17 07:00


PTT,PARTIAL THROMBOPLSTIN TIME [COAG] Q6H 





07/09/17 05:00


BMP [BASIC METABOLIC PANEL,BMP] [CHEM] DAILY 


CBC WITH AUTO DIFF [HEME] DAILY 














- Plan


Plan:: 


This 62 year old female admitted with KYLE and UTI 





1. KYLE: ATN vs exposure to nephrotoxic chemo agent, Opdivo, paired with 

Xarelto.BUN CR improving today Continue  overnight, check BMP in am. 

Monitor strict I/O with indwelling catheter. 





2. UTI: UC pending, will place on Rocephin for now. Monitor.





3. HX DVT/PE: Will hold Xarelto due to renal injury. Place on Heparin gtt. 

Monitor PTTs.





4. 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 





5. Dysphagia: Vomiting up solid foods intermittently. Dr. Hammond recommended 

EGD, will arrange outpatient appointment with General Surgery.





VTE: Heparin gtt.





Dispo: 2-3 days.

## 2017-07-08 LAB
CHLORIDE SERPL-SCNC: 118 MMOL/L (ref 98–110)
SODIUM SERPL-SCNC: 145 MMOL/L (ref 136–146)

## 2017-07-08 RX ADMIN — CEFTRIAXONE SCH MLS/HR: 1 INJECTION, SOLUTION INTRAVENOUS at 15:40

## 2017-07-08 RX ADMIN — SODIUM CHLORIDE SCH MLS/HR: 9 INJECTION, SOLUTION INTRAMUSCULAR; INTRAVENOUS; SUBCUTANEOUS at 09:03

## 2017-07-08 RX ADMIN — HEPARIN SODIUM SCH MLS/HR: 5000 INJECTION, SOLUTION INTRAVENOUS at 18:07

## 2017-07-08 NOTE — PCM.PN
- General Info


Date of Service: 07/08/17


Admission Dx/Problem (Free Text): 


 Admission Diagnosis/Problem





Admission Diagnosis/Problem      Acute renal failure








Subjective Update: 


Feeling good today, no complaints. Still having issues will swallowing and then 

vomiting intermittently, mainly with more solid foods. No chest pain or SOB, no 

palpitations. 





Functional Status: Reports: pain controlled, tolerating diet





- Review of Systems


General: Reports: Weakness, Fatigue


HEENT: Reports: no symptoms


Pulmonary: Reports: shortness of breath


Cardiovascular: Reports: No Symptoms


Gastrointestinal: Reports: No symptoms


Genitourinary: Reports: no symptoms


Musculoskeletal: Reports: no symptoms


Skin: Reports: no symptoms


Neurological: Reports: No Symptoms


Psychiatric: Reports: no symptoms





- Patient Data


Vitals - most recent: 


 Last Vital Signs











Temp  36.6 C   07/08/17 12:00


 


Pulse  63   07/08/17 12:00


 


Resp  20   07/08/17 12:00


 


BP  138/80   07/08/17 12:00


 


Pulse Ox  95   07/08/17 13:00











Weight - most recent: 70.5 kg


I&O - last 24 hours: 


 Intake & Output











 07/08/17 07/08/17 07/08/17





 06:59 14:59 22:59


 


Intake Total 1477 1863 779


 


Output Total 2181 5115 1050


 


Balance -708 518 -246











Lab Results last 24 hrs: 


 Laboratory Results - last 24 hr











  07/07/17 07/08/17 07/08/17 Range/Units





  19:05 01:00 06:47 


 


WBC     (4.0-11.0)  K/uL


 


RBC     (4.30-5.90)  M/uL


 


Hgb     (12.0-16.0)  g/dL


 


Hct     (36.0-46.0)  %


 


MCV     (80.0-98.0)  fL


 


MCH     (27.0-32.0)  pg


 


MCHC     (31.0-37.0)  g/dL


 


RDW Std Deviation     (28.0-62.0)  fl


 


RDW Coeff of Castillo     (11.0-15.0)  %


 


Plt Count     (150-400)  K/uL


 


MPV     (7.40-12.00)  fL


 


Neut % (Auto)     (48.0-80.0)  %


 


Lymph % (Auto)     (16.0-40.0)  %


 


Mono % (Auto)     (0.0-15.0)  %


 


Eos % (Auto)     (0.0-7.0)  %


 


Baso % (Auto)     (0.0-1.5)  %


 


Neut # (Auto)     (1.4-5.7)  K/uL


 


Lymph # (Auto)     (0.6-2.4)  K/uL


 


Mono # (Auto)     (0.0-0.8)  K/uL


 


Eos # (Auto)     (0.0-0.7)  K/uL


 


Baso # (Auto)     (0.0-0.1)  K/uL


 


Nucleated RBC %     /100WBC


 


Nucleated RBCs #     K/uL


 


APTT  72.3 H  54.4 H  48.0 H  (18.6-31.3)  SEC


 


Sodium     (136-146)  mmol/L


 


Potassium     (3.5-5.1)  mmol/L


 


Chloride     ()  mmol/L


 


Carbon Dioxide     (21-31)  mmol/L


 


BUN     (6.0-23.0)  mg/dL


 


Creatinine     (0.6-1.5)  mg/dL


 


Est Cr Clr Drug Dosing     mL/min


 


Estimated GFR (MDRD)     ml/min


 


Glucose     ()  mg/dL


 


Calcium     (8.8-10.8)  mg/dL














  07/08/17 07/08/17 07/08/17 Range/Units





  06:47 06:47 13:16 


 


WBC  4.25    (4.0-11.0)  K/uL


 


RBC  2.79 L    (4.30-5.90)  M/uL


 


Hgb  8.2 L    (12.0-16.0)  g/dL


 


Hct  25.9 L    (36.0-46.0)  %


 


MCV  92.8    (80.0-98.0)  fL


 


MCH  29.4    (27.0-32.0)  pg


 


MCHC  31.7    (31.0-37.0)  g/dL


 


RDW Std Deviation  49.4    (28.0-62.0)  fl


 


RDW Coeff of Castillo  14    (11.0-15.0)  %


 


Plt Count  258    (150-400)  K/uL


 


MPV  8.30    (7.40-12.00)  fL


 


Neut % (Auto)  59.4    (48.0-80.0)  %


 


Lymph % (Auto)  17.9    (16.0-40.0)  %


 


Mono % (Auto)  13.6    (0.0-15.0)  %


 


Eos % (Auto)  8.2 H    (0.0-7.0)  %


 


Baso % (Auto)  0.9    (0.0-1.5)  %


 


Neut # (Auto)  2.5    (1.4-5.7)  K/uL


 


Lymph # (Auto)  0.8    (0.6-2.4)  K/uL


 


Mono # (Auto)  0.6    (0.0-0.8)  K/uL


 


Eos # (Auto)  0.4    (0.0-0.7)  K/uL


 


Baso # (Auto)  0.0    (0.0-0.1)  K/uL


 


Nucleated RBC %  0.0    /100WBC


 


Nucleated RBCs #  0    K/uL


 


APTT    70.2 H  (18.6-31.3)  SEC


 


Sodium   145   (136-146)  mmol/L


 


Potassium   3.4 L   (3.5-5.1)  mmol/L


 


Chloride   118 H   ()  mmol/L


 


Carbon Dioxide   18 L   (21-31)  mmol/L


 


BUN   45 H   (6.0-23.0)  mg/dL


 


Creatinine   4.0 H   (0.6-1.5)  mg/dL


 


Est Cr Clr Drug Dosing   12.36   mL/min


 


Estimated GFR (MDRD)   11.3   ml/min


 


Glucose   82   ()  mg/dL


 


Calcium   8.8   (8.8-10.8)  mg/dL











Pablo Results last 24 hrs: 


 Microbiology











 07/06/17 14:10 Urine Culture - Final





 Urine, Carbone Cath (Indwelling)    No Growth











Med Orders - Current: 


 Current Medications





Albuterol (Ventolin Hfa)  8 gm INH Q6H PRN


   PRN Reason: Shortness of Breath


Albuterol/Ipratropium (Duoneb 3.0-0.5 Mg/3 Ml)  3 ml NEB Q4HRRT PRN


   PRN Reason: SOB/wheezing


Benzonatate (Tessalon Perles)  200 mg PO TID PRN


   PRN Reason: Cough


Heparin Sodium (Porcine) (Heparin Sodium)  0 units IVPUSH Q6H PRN


   PRN Reason: APPT RESULTS


Pantoprazole Sodium 40 mg/ (Sodium Chloride)  10 mls @ 300 mls/hr IVPUSH DAILY 

ZULY


   Last Admin: 07/08/17 09:03 Dose:  300 mls/hr


Sodium Chloride (Normal Saline)  1,000 mls @ 999 mls/hr IV .BOLUS [facility]


   Last Admin: 07/06/17 15:54 Dose:  999 mls/hr


Heparin Sod,Pork In 0.45% Nacl (Heparin-1/2ns 25,000 Units/500)  25,000 unit in 

500 mls @ 25.2 mls/hr IV TITRATE ZULY; 18 UNITS/KG/HR


   PRN Reason: Protocol


   Last Titration: 07/08/17 07:17 Dose:  9 units/kg/hr, 12.6 mls/hr


Sodium Chloride (Normal Saline)  1,000 mls @ 125 mls/hr IV ASDIRECTED ZULY


   Last Admin: 07/08/17 08:20 Dose:  125 mls/hr


Ceftriaxone Sodium/Dextrose 1 (gm/ Premix)  50 mls @ 100 mls/hr IV Q24H ZULY


   Last Admin: 07/08/17 15:40 Dose:  100 mls/hr


Levothyroxine Sodium (Levothyroxine)  75 mcg PO ACBREAKFAST [facility]


   Last Admin: 07/08/17 06:53 Dose:  75 mcg


Lorazepam (Ativan)  0.5 mg PO Q6HR PRN


   PRN Reason: Anxiety





Discontinued Medications





Sodium Chloride (Normal Saline)  1,000 mls @ 999 mls/hr IV .Bolus ONE


   Stop: 07/06/17 14:38


   Last Admin: 07/06/17 13:46 Dose:  999 mls/hr











- Exam


Quality Assessment: supplemental oxygen, urine catheter


General: alert, oriented, cooperative


HEENT: Pupils equal, Pupils reactive


Neck: supple, trachea midline


Lungs: Decreased breath sounds, Rales


Cardiovascular: Regular Rate, Regular Rhythm


Abdomen: bowel sounds present, soft


Back Exam: Decreased Range of Motion


Extremities: no edema


Skin: warm, dry, intact


Neurological: no new focal deficit


Psy/Mental Status: alert, normal affect





- Problem List & Annotations


(1) Acute renal injury


SNOMED Code(s): 12777828


   Code(s): N17.9 - ACUTE KIDNEY FAILURE, UNSPECIFIED   Status: Chronic   

Priority: High   Current Visit: Yes   Annotation/Comment:: Likely acute on 

chronic   





(2) Hx pulmonary embolism


SNOMED Code(s): 628614538


   Code(s): Z86.711 - PERSONAL HISTORY OF PULMONARY EMBOLISM   Status: Acute   

Priority: High   Current Visit: Yes   





(3) Small cell lung cancer


SNOMED Code(s): 886636529


   Code(s): C34.90 - MALIGNANT NEOPLASM OF UNSP PART OF UNSP BRONCHUS OR LUNG   

Status: Chronic   Priority: High   Current Visit: Yes   


Qualifiers: 


   Laterality: unspecified laterality   Qualified Code(s): C34.90 - Malignant 

neoplasm of unspecified part of unspecified bronchus or lung   





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


Status: Chronic   Priority: Medium   Current Visit: Yes   





- Problem List Review


Problem List Initiated/Reviewed/Updated: Yes





- My Orders


Last 24 Hours: 


My Active Orders





07/08/17 19:00


PTT,PARTIAL THROMBOPLSTIN TIME [COAG] Q6H 














- Plan


Plan:: 


This 62 year old female admitted with KYLE and UTI 





1. KYLE: ATN vs exposure to nephrotoxic chemo agent, Opdivo, paired with 

Xarelto.BUN CR improving today Continue  overnight, check BMP in am. 

Monitor strict I/O with indwelling catheter. 





2. UTI: UC pending, will place on Rocephin for now. Monitor.





3. HX DVT/PE: Will hold Xarelto due to renal injury. Place on Heparin gtt. 

Monitor PTTs.





4. 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 





5. Dysphagia: Vomiting up solid foods intermittently. Dr. Hammond recommended 

EGD, will arrange outpatient appointment with General Surgery.





VTE: Heparin gtt.





Dispo: 2-3 days.








July 8, 2017: The patient is a 62-year-old lady who was admitted directly from 

the oncology center for renal failure. Patient has a past medical history of 

stage IV metastatic small cell lung cancer and has been undergoing 

chemotherapy. The patient also had been diagnosed with a pulmonary emboli and 

DVT. She is on Xarelto. This was discontinued and the patient was transitioned 

to heparin secondary to renal failure. The patient's INR because of the Xarelto 

was unreliable. The APTT should be continued to be monitored. Overall the 

patient's creatinine from July 6, 2017 to July 8, 2017 has improved from 5.9 mg/

dL to 4.0 mg/dL. Because of the patient's history of cancer, chemotherapy and 

because of her anemia I suspect that this is likely acute on chronic rather 

than acute renal failure. Regardless, the patient will continue to be hydrated 

and her creatinine trended. The patient will be continued on her medications as 

currently prescribed.

## 2017-07-09 LAB
CHLORIDE SERPL-SCNC: 118 MMOL/L (ref 98–110)
SODIUM SERPL-SCNC: 146 MMOL/L (ref 136–146)

## 2017-07-09 RX ADMIN — SODIUM CHLORIDE SCH MLS/HR: 9 INJECTION, SOLUTION INTRAMUSCULAR; INTRAVENOUS; SUBCUTANEOUS at 08:04

## 2017-07-09 RX ADMIN — HEPARIN SODIUM SCH MLS/HR: 5000 INJECTION, SOLUTION INTRAVENOUS at 18:08

## 2017-07-09 RX ADMIN — CEFTRIAXONE SCH MLS/HR: 1 INJECTION, SOLUTION INTRAVENOUS at 15:16

## 2017-07-09 NOTE — PCM.PN
- General Info


Date of Service: 07/09/17


Admission Dx/Problem (Free Text): 


 Admission Diagnosis/Problem





Admission Diagnosis/Problem      Acute renal failure








Subjective Update: 


Feeling good today, no complaints. Still having issues will swallowing and then 

vomiting intermittently, mainly with more solid foods. No chest pain or SOB, no 

palpitations. 





Functional Status: Reports: pain controlled, tolerating diet





- Review of Systems


General: Reports: Weakness, Fatigue


HEENT: Reports: no symptoms


Pulmonary: Reports: no symptoms


Cardiovascular: Reports: No Symptoms


Gastrointestinal: Reports: No symptoms


Genitourinary: Reports: no symptoms


Musculoskeletal: Reports: no symptoms


Skin: Reports: no symptoms


Neurological: Reports: No Symptoms


Psychiatric: Reports: no symptoms





- Patient Data


Vitals - most recent: 


 Last Vital Signs











Temp  36.3 C   07/09/17 12:00


 


Pulse  60   07/09/17 12:00


 


Resp  20   07/09/17 12:00


 


BP  115/64   07/09/17 12:00


 


Pulse Ox  94 L  07/09/17 13:00











Weight - most recent: 70.5 kg


I&O - last 24 hours: 


 Intake & Output











 07/09/17 07/09/17 07/09/17





 06:59 14:59 22:59


 


Intake Total 1200 1938 1259


 


Output Total 2400 1500 800


 


Balance -1200 438 459











Lab Results last 24 hrs: 


 Laboratory Results - last 24 hr











  07/08/17 07/09/17 07/09/17 Range/Units





  18:53 00:34 07:06 


 


WBC    4.74  (4.0-11.0)  K/uL


 


RBC    2.99 L  (4.30-5.90)  M/uL


 


Hgb    8.8 L  (12.0-16.0)  g/dL


 


Hct    27.5 L  (36.0-46.0)  %


 


MCV    92.0  (80.0-98.0)  fL


 


MCH    29.4  (27.0-32.0)  pg


 


MCHC    32.0  (31.0-37.0)  g/dL


 


RDW Std Deviation    49.2  (28.0-62.0)  fl


 


RDW Coeff of Castillo    15  (11.0-15.0)  %


 


Plt Count    267  (150-400)  K/uL


 


MPV    8.60  (7.40-12.00)  fL


 


Neut % (Auto)    55.4  (48.0-80.0)  %


 


Lymph % (Auto)    19.6  (16.0-40.0)  %


 


Mono % (Auto)    12.2  (0.0-15.0)  %


 


Eos % (Auto)    12.2 H  (0.0-7.0)  %


 


Baso % (Auto)    0.6  (0.0-1.5)  %


 


Neut # (Auto)    2.6  (1.4-5.7)  K/uL


 


Lymph # (Auto)    0.9  (0.6-2.4)  K/uL


 


Mono # (Auto)    0.6  (0.0-0.8)  K/uL


 


Eos # (Auto)    0.6  (0.0-0.7)  K/uL


 


Baso # (Auto)    0.0  (0.0-0.1)  K/uL


 


Nucleated RBC %    0.0  /100WBC


 


Nucleated RBCs #    0  K/uL


 


APTT  101.1 H  87.1 H   (18.6-31.3)  SEC


 


Sodium     (136-146)  mmol/L


 


Potassium     (3.5-5.1)  mmol/L


 


Chloride     ()  mmol/L


 


Carbon Dioxide     (21-31)  mmol/L


 


BUN     (6.0-23.0)  mg/dL


 


Creatinine     (0.6-1.5)  mg/dL


 


Est Cr Clr Drug Dosing     mL/min


 


Estimated GFR (MDRD)     ml/min


 


Glucose     ()  mg/dL


 


Calcium     (8.8-10.8)  mg/dL


 


Magnesium     (1.5-2.3)  mEq/L














  07/09/17 07/09/17 07/09/17 Range/Units





  07:06 07:06 07:06 


 


WBC     (4.0-11.0)  K/uL


 


RBC     (4.30-5.90)  M/uL


 


Hgb     (12.0-16.0)  g/dL


 


Hct     (36.0-46.0)  %


 


MCV     (80.0-98.0)  fL


 


MCH     (27.0-32.0)  pg


 


MCHC     (31.0-37.0)  g/dL


 


RDW Std Deviation     (28.0-62.0)  fl


 


RDW Coeff of Castillo     (11.0-15.0)  %


 


Plt Count     (150-400)  K/uL


 


MPV     (7.40-12.00)  fL


 


Neut % (Auto)     (48.0-80.0)  %


 


Lymph % (Auto)     (16.0-40.0)  %


 


Mono % (Auto)     (0.0-15.0)  %


 


Eos % (Auto)     (0.0-7.0)  %


 


Baso % (Auto)     (0.0-1.5)  %


 


Neut # (Auto)     (1.4-5.7)  K/uL


 


Lymph # (Auto)     (0.6-2.4)  K/uL


 


Mono # (Auto)     (0.0-0.8)  K/uL


 


Eos # (Auto)     (0.0-0.7)  K/uL


 


Baso # (Auto)     (0.0-0.1)  K/uL


 


Nucleated RBC %     /100WBC


 


Nucleated RBCs #     K/uL


 


APTT   52.7 H   (18.6-31.3)  SEC


 


Sodium  146    (136-146)  mmol/L


 


Potassium  3.3 L    (3.5-5.1)  mmol/L


 


Chloride  118 H    ()  mmol/L


 


Carbon Dioxide  20 L    (21-31)  mmol/L


 


BUN  33 H    (6.0-23.0)  mg/dL


 


Creatinine  2.9 H    (0.6-1.5)  mg/dL


 


Est Cr Clr Drug Dosing  17.05    mL/min


 


Estimated GFR (MDRD)  16.3    ml/min


 


Glucose  82    ()  mg/dL


 


Calcium  8.5 L    (8.8-10.8)  mg/dL


 


Magnesium    1.6  (1.5-2.3)  mEq/L














  07/09/17 Range/Units





  13:02 


 


WBC   (4.0-11.0)  K/uL


 


RBC   (4.30-5.90)  M/uL


 


Hgb   (12.0-16.0)  g/dL


 


Hct   (36.0-46.0)  %


 


MCV   (80.0-98.0)  fL


 


MCH   (27.0-32.0)  pg


 


MCHC   (31.0-37.0)  g/dL


 


RDW Std Deviation   (28.0-62.0)  fl


 


RDW Coeff of Castillo   (11.0-15.0)  %


 


Plt Count   (150-400)  K/uL


 


MPV   (7.40-12.00)  fL


 


Neut % (Auto)   (48.0-80.0)  %


 


Lymph % (Auto)   (16.0-40.0)  %


 


Mono % (Auto)   (0.0-15.0)  %


 


Eos % (Auto)   (0.0-7.0)  %


 


Baso % (Auto)   (0.0-1.5)  %


 


Neut # (Auto)   (1.4-5.7)  K/uL


 


Lymph # (Auto)   (0.6-2.4)  K/uL


 


Mono # (Auto)   (0.0-0.8)  K/uL


 


Eos # (Auto)   (0.0-0.7)  K/uL


 


Baso # (Auto)   (0.0-0.1)  K/uL


 


Nucleated RBC %   /100WBC


 


Nucleated RBCs #   K/uL


 


APTT  52.2 H  (18.6-31.3)  SEC


 


Sodium   (136-146)  mmol/L


 


Potassium   (3.5-5.1)  mmol/L


 


Chloride   ()  mmol/L


 


Carbon Dioxide   (21-31)  mmol/L


 


BUN   (6.0-23.0)  mg/dL


 


Creatinine   (0.6-1.5)  mg/dL


 


Est Cr Clr Drug Dosing   mL/min


 


Estimated GFR (MDRD)   ml/min


 


Glucose   ()  mg/dL


 


Calcium   (8.8-10.8)  mg/dL


 


Magnesium   (1.5-2.3)  mEq/L











Med Orders - Current: 


 Current Medications





Albuterol (Ventolin Hfa)  8 gm INH Q6H PRN


   PRN Reason: Shortness of Breath


Albuterol/Ipratropium (Duoneb 3.0-0.5 Mg/3 Ml)  3 ml NEB Q4HRRT PRN


   PRN Reason: SOB/wheezing


Benzonatate (Tessalon Perles)  200 mg PO TID PRN


   PRN Reason: Cough


Heparin Sodium (Porcine) (Heparin Sodium)  0 units IVPUSH Q6H PRN


   PRN Reason: APPT RESULTS


Pantoprazole Sodium 40 mg/ (Sodium Chloride)  10 mls @ 300 mls/hr IVPUSH DAILY 

[facility]


   Last Admin: 07/09/17 08:04 Dose:  300 mls/hr


Sodium Chloride (Normal Saline)  1,000 mls @ 999 mls/hr IV .BOLUS [facility]


   Last Admin: 07/06/17 15:54 Dose:  999 mls/hr


Heparin Sod,Pork In 0.45% Nacl (Heparin-1/2ns 25,000 Units/500)  25,000 unit in 

500 mls @ 25.2 mls/hr IV TITRATE ZULY; 18 UNITS/KG/HR


   PRN Reason: Protocol


   Last Titration: 07/09/17 07:30 Dose:  4 units/kg/hr, 5.6 mls/hr


Sodium Chloride (Normal Saline)  1,000 mls @ 125 mls/hr IV ASDIRECTED ZULY


   Last Admin: 07/09/17 08:13 Dose:  125 mls/hr


Ceftriaxone Sodium/Dextrose 1 (gm/ Premix)  50 mls @ 100 mls/hr IV Q24H ZULY


   Last Admin: 07/09/17 15:16 Dose:  100 mls/hr


Levothyroxine Sodium (Levothyroxine)  75 mcg PO ACBREAKFAST ZULY


   Last Admin: 07/09/17 06:43 Dose:  75 mcg


Lorazepam (Ativan)  0.5 mg PO Q6HR PRN


   PRN Reason: Anxiety


Polyethylene Glycol (Miralax)  17 gm PO BID PRN


   PRN Reason: Constipation


   Last Admin: 07/08/17 20:40 Dose:  17 gm





Discontinued Medications





Sodium Chloride (Normal Saline)  1,000 mls @ 999 mls/hr IV .Bolus ONE


   Stop: 07/06/17 14:38


   Last Admin: 07/06/17 13:46 Dose:  999 mls/hr











- Exam


Quality Assessment: urine catheter.  No: supplemental oxygen


General: alert, oriented, no acute distress


HEENT: Pupils equal, EOMI.  No: Scleral icterus


Neck: supple, trachea midline, +2 carotid pulse wo bruit


Lungs: Normal respiratory effort, Crackles


Cardiovascular: Regular Rate, Regular Rhythm


Abdomen: bowel sounds present, soft, no tenderness


Extremities: no edema


Skin: warm, dry, intact


Neurological: no new focal deficit


Psy/Mental Status: alert, normal affect





- Problem List & Annotations


(1) Acute renal injury


SNOMED Code(s): 47372455


   Code(s): N17.9 - ACUTE KIDNEY FAILURE, UNSPECIFIED   Status: Chronic   

Priority: High   Current Visit: Yes   Annotation/Comment:: Likely acute on 

chronic   





(2) Hx pulmonary embolism


SNOMED Code(s): 158986472


   Code(s): Z86.711 - PERSONAL HISTORY OF PULMONARY EMBOLISM   Status: Acute   

Priority: High   Current Visit: Yes   





(3) Small cell lung cancer


SNOMED Code(s): 985899319


   Code(s): C34.90 - MALIGNANT NEOPLASM OF UNSP PART OF UNSP BRONCHUS OR LUNG   

Status: Chronic   Priority: High   Current Visit: Yes   


Qualifiers: 


   Laterality: unspecified laterality   Qualified Code(s): C34.90 - Malignant 

neoplasm of unspecified part of unspecified bronchus or lung   





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


Status: Chronic   Priority: Medium   Current Visit: Yes   





- Problem List Review


Problem List Initiated/Reviewed/Updated: Yes





- My Orders


Last 24 Hours: 


My Active Orders





07/08/17 16:05


Polyethylene Glycol 3350 [MiraLAX]   17 gm PO BID PRN 





07/09/17 15:19


Remove Urinary Catheter [Urinary Catheter Removal] [RC] Per Unit Routine 





07/10/17 05:11


PTT,PARTIAL THROMBOPLSTIN TIME [COAG] AM 














- Plan


Plan:: 


This 62 year old female admitted with KYLE and UTI 





1. KYLE: ATN vs exposure to nephrotoxic chemo agent, Opdivo, paired with 

Xarelto.BUN CR improving today Continue  overnight, check BMP in am. 

Monitor strict I/O with indwelling catheter. 





2. UTI: UC pending, will place on Rocephin for now. Monitor.





3. HX DVT/PE: Will hold Xarelto due to renal injury. Place on Heparin gtt. 

Monitor PTTs.





4. 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 





5. Dysphagia: Vomiting up solid foods intermittently. Dr. Hammond recommended 

EGD, will arrange outpatient appointment with General Surgery.





VTE: Heparin gtt.





Dispo: 2-3 days.








July 8, 2017: The patient is a 62-year-old lady who was admitted directly from 

the oncology center for renal failure. Patient has a past medical history of 

stage IV metastatic small cell lung cancer and has been undergoing 

chemotherapy. The patient also had been diagnosed with a pulmonary emboli and 

DVT. She is on Xarelto. This was discontinued and the patient was transitioned 

to heparin secondary to renal failure. The patient's INR because of the Xarelto 

was unreliable. The APTT should be continued to be monitored. Overall the 

patient's creatinine from July 6, 2017 to July 8, 2017 has improved from 5.9 mg/

dL to 4.0 mg/dL. Because of the patient's history of cancer, chemotherapy and 

because of her anemia I suspect that this is likely acute on chronic rather 

than acute renal failure. Regardless, the patient will continue to be hydrated 

and her creatinine trended. The patient will be continued on her medications as 

currently prescribed.





July 9, 2017:Patient is a 62-year-old lady was admitted directly from the 

oncology center secondary to renal failure. Patient and the patient's family 

today reports that she has had relatively poor oral fluid intake. Overall, 

today the patient says that she is feeling much better. Patient does have a 

history of pulmonary emboli and DVT and she previously had been on Xarelto. The 

patient has been placed on heparin and the Xarelto discontinued temporarily. 

The patient does have a history of stage IV metastatic small cell lung cancer 

and has been undergoing chemotherapy and has been tolerating this. The patient 

today says that she is breathing better and she is in very little pain. The 

patient creatinine had improved from 4.0 mg deciliter to 2.9 mg/dL. I do not 

think that the patient is quite ready to go home yet but she should be okay 

tomorrow. For now the patient's BUN/creatinine will be monitored and after the 

heparin should be transitioned directly to Xarelto for her DVT/pulmonary 

emboli. Patient is also having some problems with dysphagia and she should be 

evaluated for an EGD as an outpatient. For the most part the patient is 

continue to improve. The urine in her Carbone catheter is bright yellow and clear 

and I've ordered the Carbone catheter removed. The patient is able to ambulate to 

the restroom safely. Patient will have her treatment plan modified as 

conditions and information indicates.

## 2017-07-10 VITALS — SYSTOLIC BLOOD PRESSURE: 140 MMHG | DIASTOLIC BLOOD PRESSURE: 62 MMHG

## 2017-07-10 LAB
CHLORIDE SERPL-SCNC: 118 MMOL/L (ref 98–110)
SODIUM SERPL-SCNC: 147 MMOL/L (ref 136–146)

## 2017-07-10 RX ADMIN — SODIUM CHLORIDE SCH MLS/HR: 9 INJECTION, SOLUTION INTRAMUSCULAR; INTRAVENOUS; SUBCUTANEOUS at 08:55

## 2017-07-10 NOTE — PCM.DCSUM1
<Melanie Young - Last Filed: 07/17/17 11:05>





**Discharge Summary





- Hospital Course


Free Text/Narrative:: 





Patient is a 62-year-old lady was admitted directly from the oncology center 

secondary to renal failure. Patient and the patient's family today reports that 

she has had relatively poor oral fluid intake. Overall, today the patient says 

that she is feeling much better. Patient does have a history of pulmonary 

emboli and DVT and she previously had been on Xarelto. The patient has been 

placed on heparin and the Xarelto discontinued temporarily. The patient does 

have a history of stage IV metastatic small cell lung cancer and has been 

undergoing chemotherapy and has been tolerating this. The patient today says 

that she is breathing better and she is in very little pain. The patient 

creatinine had improved from 4.0 mg deciliter to 2.9 mg/dL. I do not think that 

the patient is quite ready to go home yet but she should be okay tomorrow. For 

now the patient's BUN/creatinine will be monitored and after the heparin should 

be transitioned directly to Xarelto for her DVT/pulmonary emboli. Patient is 

also having some problems with dysphagia and she should be evaluated for an EGD 

as an outpatient. For the most part the patient is continue to improve. The 

urine in her Carbone catheter is bright yellow and clear and I've ordered the 

Carbone catheter removed. The patient is able to ambulate to the restroom safely. 

Patient will have her treatment plan modified as conditions and information 

indicates.








- Discharge Data


Discharge Date: 07/10/17


Discharge Disposition: Home, Self-Care 01


Condition: Good





- Patient Instructions


Diet: Renal Diet


Activity: As Tolerated


Driving: Do Not Drive


Showering/Bathing: May Shower


Notify Provider of: Fever, Increased Pain, Swelling and Redness, Drainage, 

Nausea and/or Vomiting





- Discharge Plan


Prescriptions/Med Rec: 


Enoxaparin Sodium [Lovenox] 70 mg SQ DAILY #14 ml


Warfarin [Coumadin] 5 mg PO DAILY #30 tablet


Home Medications: 


 Home Meds





LORazepam 0.5 mg PO Q6HR PRN 05/04/16 [History]


Carvedilol 6.25 mg PO BID 01/09/17 [History]


Albuterol Sulfate [Proair Respiclick] 2 inh IH Q6H PRN 07/06/17 [History]


Benzonatate 200 mg PO TID PRN 07/06/17 [History]


Levothyroxine Sodium 75 mcg PO ACBREAKFAST 07/06/17 [History]


Magnesium Oxide 800 mg PO TID 07/06/17 [History]


Nivolumab [Opdivo] 240 mg IV Q14D 07/06/17 [History]


Omeprazole 20 mg PO ACBREAKFAST 07/06/17 [History]


Enoxaparin Sodium [Lovenox] 70 mg SQ DAILY #14 ml 07/10/17 [Rx]


Warfarin [Coumadin] 5 mg PO DAILY #30 tablet 07/10/17 [Rx]








Patient Handouts:  Enoxaparin injection, Warfarin tablets, Pulmonary Embolism, 

How and Where to Give Subcutaneous Enoxaparin Injections, Deep Vein Thrombosis


Referrals: 


[facility] [Outside]


Lg Centeno MD [Physician] - 07/13/17 12:45 pm





- General Info


Date of Service: 07/10/17





- Review of Systems


General: Reports: No Symptoms


HEENT: Reports: no symptoms


Pulmonary: Reports: no symptoms


Cardiovascular: Reports: No Symptoms


Gastrointestinal: Reports: No symptoms


Genitourinary: Reports: no symptoms


Musculoskeletal: Reports: no symptoms


Skin: Reports: no symptoms


Neurological: Reports: No Symptoms


Psychiatric: Reports: no symptoms





- Patient Data


Vitals - Most Recent: 


 Last Vital Signs











Temp  97.5 F   07/10/17 08:00


 


Pulse  77   07/10/17 08:00


 


Resp  22 H  07/10/17 08:00


 


BP  140/62   07/10/17 08:00


 


Pulse Ox  95   07/10/17 08:00











Weight - Most Recent: 70.5 kg


I&O - Last 24 hours: 


 Intake & Output











 07/09/17 07/10/17 07/10/17





 22:59 06:59 14:59


 


Intake Total 1259 1073 


 


Output Total 800 1735 


 


Balance 459 -662 











Lab Results - Last 24 hrs: 


 Laboratory Results - last 24 hr











  07/09/17 07/10/17 07/10/17 Range/Units





  13:02 05:00 05:00 


 


WBC    4.77  (4.0-11.0)  K/uL


 


RBC    2.92 L  (4.30-5.90)  M/uL


 


Hgb    8.5 L  (12.0-16.0)  g/dL


 


Hct    26.9 L  (36.0-46.0)  %


 


MCV    92.1  (80.0-98.0)  fL


 


MCH    29.1  (27.0-32.0)  pg


 


MCHC    31.6  (31.0-37.0)  g/dL


 


RDW Std Deviation    49.8  (28.0-62.0)  fl


 


RDW Coeff of Castillo    15  (11.0-15.0)  %


 


Plt Count    261  (150-400)  K/uL


 


MPV    8.60  (7.40-12.00)  fL


 


Neut % (Auto)    57.1  (48.0-80.0)  %


 


Lymph % (Auto)    18.0  (16.0-40.0)  %


 


Mono % (Auto)    13.4  (0.0-15.0)  %


 


Eos % (Auto)    10.9 H  (0.0-7.0)  %


 


Baso % (Auto)    0.6  (0.0-1.5)  %


 


Neut # (Auto)    2.7  (1.4-5.7)  K/uL


 


Lymph # (Auto)    0.9  (0.6-2.4)  K/uL


 


Mono # (Auto)    0.6  (0.0-0.8)  K/uL


 


Eos # (Auto)    0.5  (0.0-0.7)  K/uL


 


Baso # (Auto)    0.0  (0.0-0.1)  K/uL


 


Nucleated RBC %    0.0  /100WBC


 


Nucleated RBCs #    0  K/uL


 


APTT  52.2 H  47.1 H   (18.6-31.3)  SEC


 


Sodium     (136-146)  mmol/L


 


Potassium     (3.5-5.1)  mmol/L


 


Chloride     ()  mmol/L


 


Carbon Dioxide     (21-31)  mmol/L


 


BUN     (6.0-23.0)  mg/dL


 


Creatinine     (0.6-1.5)  mg/dL


 


Est Cr Clr Drug Dosing     mL/min


 


Estimated GFR (MDRD)     ml/min


 


Glucose     ()  mg/dL


 


Calcium     (8.8-10.8)  mg/dL














  07/10/17 Range/Units





  05:00 


 


WBC   (4.0-11.0)  K/uL


 


RBC   (4.30-5.90)  M/uL


 


Hgb   (12.0-16.0)  g/dL


 


Hct   (36.0-46.0)  %


 


MCV   (80.0-98.0)  fL


 


MCH   (27.0-32.0)  pg


 


MCHC   (31.0-37.0)  g/dL


 


RDW Std Deviation   (28.0-62.0)  fl


 


RDW Coeff of Castillo   (11.0-15.0)  %


 


Plt Count   (150-400)  K/uL


 


MPV   (7.40-12.00)  fL


 


Neut % (Auto)   (48.0-80.0)  %


 


Lymph % (Auto)   (16.0-40.0)  %


 


Mono % (Auto)   (0.0-15.0)  %


 


Eos % (Auto)   (0.0-7.0)  %


 


Baso % (Auto)   (0.0-1.5)  %


 


Neut # (Auto)   (1.4-5.7)  K/uL


 


Lymph # (Auto)   (0.6-2.4)  K/uL


 


Mono # (Auto)   (0.0-0.8)  K/uL


 


Eos # (Auto)   (0.0-0.7)  K/uL


 


Baso # (Auto)   (0.0-0.1)  K/uL


 


Nucleated RBC %   /100WBC


 


Nucleated RBCs #   K/uL


 


APTT   (18.6-31.3)  SEC


 


Sodium  147 H  (136-146)  mmol/L


 


Potassium  3.3 L  (3.5-5.1)  mmol/L


 


Chloride  118 H  ()  mmol/L


 


Carbon Dioxide  21  (21-31)  mmol/L


 


BUN  26 H  (6.0-23.0)  mg/dL


 


Creatinine  2.3 H  (0.6-1.5)  mg/dL


 


Est Cr Clr Drug Dosing  21.49  mL/min


 


Estimated GFR (MDRD)  21.3  ml/min


 


Glucose  91  ()  mg/dL


 


Calcium  8.1 L  (8.8-10.8)  mg/dL











Med Orders - Current: 


 Current Medications





Albuterol (Ventolin Hfa)  8 gm INH Q6H PRN


   PRN Reason: Shortness of Breath


Albuterol/Ipratropium (Duoneb 3.0-0.5 Mg/3 Ml)  3 ml NEB Q4HRRT PRN


   PRN Reason: SOB/wheezing


Benzonatate (Tessalon Perles)  200 mg PO TID PRN


   PRN Reason: Cough


Enoxaparin Sodium (Lovenox)  70 mg SUBCUT ONETIME ONE


   Stop: 07/10/17 08:44


Heparin Sodium (Porcine) (Heparin Sodium)  0 units IVPUSH Q6H PRN


   PRN Reason: APPT RESULTS


Hydromorphone HCl (Dilaudid)  1 mg IVPUSH Q2H PRN


   PRN Reason: Pain


   Last Admin: 07/10/17 00:12 Dose:  1 mg


Pantoprazole Sodium 40 mg/ (Sodium Chloride)  10 mls @ 300 mls/hr IVPUSH DAILY 

ZULY


   Last Admin: 07/09/17 08:04 Dose:  300 mls/hr


Sodium Chloride (Normal Saline)  1,000 mls @ 999 mls/hr IV .BOLUS ZULY


   Last Admin: 07/06/17 15:54 Dose:  999 mls/hr


Heparin Sod,Pork In 0.45% Nacl (Heparin-1/2ns 25,000 Units/500)  25,000 unit in 

500 mls @ 25.2 mls/hr IV TITRATE ZULY; 18 UNITS/KG/HR


   PRN Reason: Protocol


   Last Titration: 07/10/17 06:03 Dose:  6 units/kg/hr, 8.4 mls/hr


Sodium Chloride (Normal Saline)  1,000 mls @ 125 mls/hr IV ASDIRECTED ZULY


   Last Admin: 07/09/17 23:53 Dose:  125 mls/hr


Ceftriaxone Sodium/Dextrose 1 (gm/ Premix)  50 mls @ 100 mls/hr IV Q24H ZULY


   Last Admin: 07/09/17 15:16 Dose:  100 mls/hr


Levothyroxine Sodium (Levothyroxine)  75 mcg PO ACBREAKFAST ZULY


   Last Admin: 07/10/17 06:34 Dose:  75 mcg


Lorazepam (Ativan)  0.5 mg PO Q6HR PRN


   PRN Reason: Anxiety


Polyethylene Glycol (Miralax)  17 gm PO BID PRN


   PRN Reason: Constipation


   Last Admin: 07/08/17 20:40 Dose:  17 gm


Potassium Chloride (Potassium Chloride)  40 meq PO DAILY ZULY





Discontinued Medications





Heparin Sodium (Porcine) (Heparin Sodium)  1,400 units IVPUSH ONETIME ONE


   PRN Reason: Protocol


   Stop: 07/10/17 06:00


   Last Admin: 07/10/17 06:17 Dose:  1,400 units


Sodium Chloride (Normal Saline)  1,000 mls @ 999 mls/hr IV .Bolus ONE


   Stop: 07/06/17 14:38


   Last Admin: 07/06/17 13:46 Dose:  999 mls/hr











- Exam


General: Reports: alert, oriented


HEENT: Reports: Pupils equal, EOMI


Neck: Reports: supple, trachea midline


Lungs: Reports: Clear to auscultation, Normal respiratory effort


Cardiovascular: Reports: Regular Rate, Regular Rhythm


Abdomen: Reports: bowel sounds present, soft


Back Exam: Reports: Normal Inspection


Extremities: Reports: no edema


Skin: Reports: warm, dry, intact


Neurological: Reports: no new focal deficit





*Q Meaningful Use (DIS)





- VTE *Q


VTE Criteria *Q: 








- Stroke *Q


Stroke Criteria *Q: 








- AMI *Q


AMI Criteria *Q: 








<Adrián Mckeon - Last Filed: 07/18/17 09:07>





**Discharge Summary





- Hospital Course


Free Text/Narrative:: 


I was present with the resident during history and examination. I discussed the 

case with the resident and agree with the findings and plan as documented in 

the residents note.








- Discharge Diagnosis/Problem(s)


(1) Acute renal injury


SNOMED Code(s): 28443116


   ICD Code: N17.9 - ACUTE KIDNEY FAILURE, UNSPECIFIED   Status: Chronic   

Priority: High   Problem Details: Likely acute on chronic   





(2) Hx pulmonary embolism


SNOMED Code(s): 426127965


   ICD Code: Z86.711 - PERSONAL HISTORY OF PULMONARY EMBOLISM   Status: Acute   

Priority: High   





(3) Small cell lung cancer


SNOMED Code(s): 657946869


   ICD Code: C34.90 - MALIGNANT NEOPLASM OF UNSP PART OF UNSP BRONCHUS OR LUNG 

  Status: Chronic   Priority: High   


Qualifiers: 


   Laterality: unspecified laterality   Qualified Code(s): C34.90 - Malignant 

neoplasm of unspecified part of unspecified bronchus or lung   





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


Status: Chronic   Priority: Medium   





- Patient Data


Vitals - Most Recent: 


 Last Vital Signs











Temp  36.4 C   07/10/17 08:00


 


Pulse  77   07/10/17 08:00


 


Resp  22 H  07/10/17 08:00


 


BP  140/62   07/10/17 08:00


 


Pulse Ox  95   07/10/17 08:00











Med Orders - Current: 


 Current Medications








Discontinued Medications





Albuterol (Ventolin Hfa)  8 gm INH Q6H PRN


   PRN Reason: Shortness of Breath


Albuterol/Ipratropium (Duoneb 3.0-0.5 Mg/3 Ml)  3 ml NEB Q4HRRT PRN


   PRN Reason: SOB/wheezing


Benzonatate (Tessalon Perles)  200 mg PO TID PRN


   PRN Reason: Cough


Enoxaparin Sodium (Lovenox)  70 mg SUBCUT ONETIME ONE


   Stop: 07/10/17 08:44


   Last Admin: 07/10/17 08:54 Dose:  70 mg


Heparin Sodium (Porcine) (Heparin Sodium)  0 units IVPUSH Q6H PRN


   PRN Reason: APPT RESULTS


Heparin Sodium (Porcine) (Heparin Sodium)  1,400 units IVPUSH ONETIME ONE


   PRN Reason: Protocol


   Stop: 07/10/17 06:00


   Last Admin: 07/10/17 06:17 Dose:  1,400 units


Hydromorphone HCl (Dilaudid)  1 mg IVPUSH Q2H PRN


   PRN Reason: Pain


   Last Admin: 07/10/17 00:12 Dose:  1 mg


Pantoprazole Sodium 40 mg/ (Sodium Chloride)  10 mls @ 300 mls/hr IVPUSH DAILY 

ZULY


   Last Admin: 07/10/17 08:55 Dose:  300 mls/hr


Sodium Chloride (Normal Saline)  1,000 mls @ 999 mls/hr IV .BOLUS ZULY


   Last Admin: 07/06/17 15:54 Dose:  999 mls/hr


Sodium Chloride (Normal Saline)  1,000 mls @ 999 mls/hr IV .Bolus ONE


   Stop: 07/06/17 14:38


   Last Admin: 07/06/17 13:46 Dose:  999 mls/hr


Heparin Sod,Pork In 0.45% Nacl (Heparin-1/2ns 25,000 Units/500)  25,000 unit in 

500 mls @ 25.2 mls/hr IV TITRATE ZULY; 18 UNITS/KG/HR


   PRN Reason: Protocol


   Last Titration: 07/10/17 06:03 Dose:  6 units/kg/hr, 8.4 mls/hr


Sodium Chloride (Normal Saline)  1,000 mls @ 125 mls/hr IV ASDIRECTED ZULY


   Last Admin: 07/09/17 23:53 Dose:  125 mls/hr


Ceftriaxone Sodium/Dextrose 1 (gm/ Premix)  50 mls @ 100 mls/hr IV Q24H [facility]


   Last Admin: 07/09/17 15:16 Dose:  100 mls/hr


Levothyroxine Sodium (Levothyroxine)  75 mcg PO ACBREAKFAST [facility]


   Last Admin: 07/10/17 06:34 Dose:  75 mcg


Lorazepam (Ativan)  0.5 mg PO Q6HR PRN


   PRN Reason: Anxiety


Polyethylene Glycol (Miralax)  17 gm PO BID PRN


   PRN Reason: Constipation


   Last Admin: 07/08/17 20:40 Dose:  17 gm


Potassium Chloride (Potassium Chloride)  40 meq PO DAILY [facility]


   Last Admin: 07/10/17 08:54 Dose:  40 meq


Warfarin Sodium (Coumadin)  5 mg PO 07/10/17@1000 [facility]


   Stop: 07/10/17 12:00


   Last Admin: 07/10/17 09:29 Dose:  5 mg











*Q Meaningful Use (DIS)





- VTE *Q


VTE Criteria *Q: 








- Stroke *Q


Stroke Criteria *Q: 








- AMI *Q


AMI Criteria *Q:

## 2018-02-27 ENCOUNTER — HOSPITAL ENCOUNTER (OUTPATIENT)
Dept: HOSPITAL 56 - MW.ED | Age: 63
Setting detail: OBSERVATION
LOS: 1 days | Discharge: NURSING, REHAB OR LONG-TERM CARE | End: 2018-02-28
Attending: FAMILY MEDICINE | Admitting: FAMILY MEDICINE
Payer: MEDICARE

## 2018-02-27 DIAGNOSIS — C79.31: ICD-10-CM

## 2018-02-27 DIAGNOSIS — E03.9: ICD-10-CM

## 2018-02-27 DIAGNOSIS — Z91.040: ICD-10-CM

## 2018-02-27 DIAGNOSIS — R79.89: ICD-10-CM

## 2018-02-27 DIAGNOSIS — Z87.891: ICD-10-CM

## 2018-02-27 DIAGNOSIS — I10: ICD-10-CM

## 2018-02-27 DIAGNOSIS — Z86.711: ICD-10-CM

## 2018-02-27 DIAGNOSIS — Z79.01: ICD-10-CM

## 2018-02-27 DIAGNOSIS — E86.0: Primary | ICD-10-CM

## 2018-02-27 DIAGNOSIS — Z79.899: ICD-10-CM

## 2018-02-27 DIAGNOSIS — R11.2: ICD-10-CM

## 2018-02-27 DIAGNOSIS — C34.90: ICD-10-CM

## 2018-02-27 DIAGNOSIS — R79.1: ICD-10-CM

## 2018-02-27 DIAGNOSIS — K21.9: ICD-10-CM

## 2018-02-27 DIAGNOSIS — Z51.81: ICD-10-CM

## 2018-02-27 LAB
CHLORIDE SERPL-SCNC: 104 MMOL/L (ref 98–110)
SODIUM SERPL-SCNC: 142 MMOL/L (ref 136–146)

## 2018-02-27 PROCEDURE — 81001 URINALYSIS AUTO W/SCOPE: CPT

## 2018-02-27 PROCEDURE — 96361 HYDRATE IV INFUSION ADD-ON: CPT

## 2018-02-27 PROCEDURE — 83690 ASSAY OF LIPASE: CPT

## 2018-02-27 PROCEDURE — G0378 HOSPITAL OBSERVATION PER HR: HCPCS

## 2018-02-27 PROCEDURE — C9113 INJ PANTOPRAZOLE SODIUM, VIA: HCPCS

## 2018-02-27 PROCEDURE — 74022 RADEX COMPL AQT ABD SERIES: CPT

## 2018-02-27 PROCEDURE — 85025 COMPLETE CBC W/AUTO DIFF WBC: CPT

## 2018-02-27 PROCEDURE — 96367 TX/PROPH/DG ADDL SEQ IV INF: CPT

## 2018-02-27 PROCEDURE — 96366 THER/PROPH/DIAG IV INF ADDON: CPT

## 2018-02-27 PROCEDURE — 74177 CT ABD & PELVIS W/CONTRAST: CPT

## 2018-02-27 PROCEDURE — 96375 TX/PRO/DX INJ NEW DRUG ADDON: CPT

## 2018-02-27 PROCEDURE — 99285 EMERGENCY DEPT VISIT HI MDM: CPT

## 2018-02-27 PROCEDURE — 36415 COLL VENOUS BLD VENIPUNCTURE: CPT

## 2018-02-27 PROCEDURE — 85610 PROTHROMBIN TIME: CPT

## 2018-02-27 PROCEDURE — 83735 ASSAY OF MAGNESIUM: CPT

## 2018-02-27 PROCEDURE — 96365 THER/PROPH/DIAG IV INF INIT: CPT

## 2018-02-27 PROCEDURE — 76705 ECHO EXAM OF ABDOMEN: CPT

## 2018-02-27 PROCEDURE — 80053 COMPREHEN METABOLIC PANEL: CPT

## 2018-02-27 PROCEDURE — 93005 ELECTROCARDIOGRAM TRACING: CPT

## 2018-02-27 PROCEDURE — 84100 ASSAY OF PHOSPHORUS: CPT

## 2018-02-27 NOTE — EDM.PDOC
ED HPI GENERAL MEDICAL PROBLEM





- General


Chief Complaint: Abdominal Pain


Stated Complaint: VOMITING


Time Seen by Provider: 02/27/18 20:18





- History of Present Illness


INITIAL COMMENTS - FREE TEXT/NARRATIVE: 





HISTORY AND PHYSICAL:





History of present illness:


Patient 63-year-old female history of metastatic lung cancer who presents with 

concern of vomiting since this afternoon with no oral intake she feels 

dehydrated slightly weak she states her pain has been well controlled she is on 

palliative chemotherapy and has been doing relatively well with this per her 

family. She is also currently on warfarin for pulmonary embolism





Review of systems: 


As per history of present illness and below otherwise all systems reviewed and 

negative.





Past medical history: 


As per history of present illness and as reviewed below otherwise 

noncontributory.





Surgical history: 


As per history of present illness and as reviewed below otherwise 

noncontributory.





Social history: 


No reported history of drug or alcohol abuse.





Family history: 


As per history of present illness and as reviewed below otherwise 

noncontributory.





Physical exam:


HEENT: Atraumatic, normocephalic, pupils reactive, mild conjunctival pallor 

noted no scleral icterus, mucous membranes dry throat clear, neck supple, 

nontender, trachea midline.


Lungs: Diminished, breath sounds equal bilaterally, chest nontender.


Heart: S1S2, regular, negative for clicks, rubs, or JVD.


Abdomen: Soft, nondistended, no localized tenderness. Negative for masses or 

hepatosplenomegaly. Negative for costovertebral tenderness.


Pelvis: Stable nontender.


Genitourinary: Deferred.


Rectal: Deferred.


Extremities: Atraumatic, negative for cords or calf pain. Neurovascular 

unremarkable.


Neuro: Awake, alert, oriented. Cranial nerves II through XII unremarkable. 

Cerebellum unremarkable. Motor and sensory unremarkable throughout. Exam 

nonfocal.





Diagnostics:


CBC CMP UA lipase EKG PT/INR abdominal series with chest x-ray





Therapeutics:


Normal saline 1 L bolus Zofran 4 mg IV





Impression: 


# 1 history of metastatic lung CA #2 vomiting with dehydration #3 history of 

pulmonary embolism





Definitive disposition and diagnosis as appropriate pending reevaluation and 

review of above.


  ** abdominal


Pain Score (Numeric/FACES): 8





- Related Data


 Allergies











Allergy/AdvReac Type Severity Reaction Status Date / Time


 


latex Allergy  Cannot Verified 02/27/18 19:43





   Remember  











Home Meds: 


 Home Meds





Carvedilol 6.25 mg PO BID 01/09/17 [History]


Benzonatate 200 mg PO TID PRN 07/06/17 [History]


Levothyroxine Sodium 100 mcg PO DAILY 07/06/17 [History]


Magnesium Oxide 800 mg PO TID 07/06/17 [History]


Omeprazole 20 mg PO ACBREAKFAST 07/06/17 [History]


Multivitamins [Childrens Chewable Vitamin] 0 mg PO DAILY 02/27/18 [History]


Warfarin [Coumadin] 7.5 mg PO ASDIRECTED 02/27/18 [History]


Warfarin [Coumadin] 10 mg PO ASDIRECTED 02/27/18 [History]











Past Medical History


HEENT History: Reports: Impaired Vision


Other HEENT History: wears glasses sometimes


Cardiovascular History: Reports: Hypertension


Other Cardiovascular History: not currently taking medication


Respiratory History: Reports: Other (See Below)


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 

the brain


Gastrointestinal History: Reports: GERD


Genitourinary History: Reports: Other (See Below)


Other Genitourinary History: Renal failure


OB/GYN History: Reports: Pregnancy


Musculoskeletal History: Reports: Fracture


Other Musculoskeletal History: fx left ankle


Neurological History: Reports: Vertigo, Other (See Below)


Other Neuro History: dizziness; Lung mets to the brain


Psychiatric History: Reports: None


Endocrine/Metabolic History: Reports: Hypothyroidism


Hematologic History: Reports: None


Other Hematologic History: son states her blood has been slow to clot recently


Immunologic History: Reports: None


Oncologic (Cancer) History: Reports: Lung


Other Oncologic History: lung cancer is metastisized to brain


Dermatologic History: Reports: None





- Infectious Disease History


Infectious Disease History: Reports: Chicken Pox





- Past Surgical History


Head Surgeries/Procedures: Reports: None


HEENT Surgical History: Reports: None


Cardiovascular Surgical History: Reports: Other (See Below)


Other Cardiovascular Surgeries/Procedures: portacath


Respiratory Surgical History: Reports: None


Female  Surgical History: Reports: None


Musculoskeletal Surgical History: Reports: None


Oncologic Surgical History: Reports: Other (See Below)





Social & Family History





- Family History


Family Medical History: Noncontributory





- Tobacco Use


Smoking Status *Q: Former Smoker


Years of Tobacco use: 40


Packs/Tins Daily: 0.2


Used Tobacco, but Quit: Yes


Month Tobacco Last Used: 2years


Second Hand Smoke Exposure: No





- Caffeine Use


Caffeine Use: Reports: Soda





- Recreational Drug Use


Recreational Drug Use: No





- Living Situation & Occupation


Living situation: Reports: , with Family





ED ROS GENERAL





- Review of Systems


Review Of Systems: ROS reveals no pertinent complaints other than HPI.





ED EXAM, GENERAL





- Physical Exam


Exam: See Below (See dictation)





Course





- Vital Signs


Last Recorded V/S: 


 Last Vital Signs











Temp  36.6 C   02/27/18 20:30


 


Pulse  85   02/27/18 20:30


 


Resp  20   02/27/18 20:30


 


BP  109/74   02/27/18 20:30


 


Pulse Ox  96   02/27/18 19:39














- Orders/Labs/Meds


Orders: 


 Active Orders 24 hr











 Category Date Time Status


 


 Cardiac Monitoring [RC] .AS DIRECTED Care  02/27/18 20:18 Active


 


 EKG Documentation Completion [RC] STAT Care  02/27/18 20:18 Active


 


 Pulse Oximetry [RC] ASDIRECTED Care  02/27/18 20:18 Active


 


 Abdomen Series w Chest 1V [CR] Stat Exams  02/27/18 20:19 Taken


 


 UA W/MICROSCOPIC [URIN] Stat Lab  02/27/18 20:18 Ordered


 


 Sodium Chloride 0.9% [Saline Flush] Med  02/27/18 20:18 Active





 10 ml FLUSH ASDIRECTED PRN   


 


 Sodium Chloride 0.9% [Saline Flush] Med  02/27/18 20:18 Active





 2.5 ml FLUSH ASDIRECTED PRN   


 


 Saline Lock Insert [OM.PC] Stat Oth  02/27/18 20:18 Ordered








 Medication Orders





Sodium Chloride (Saline Flush)  10 ml FLUSH ASDIRECTED PRN


   PRN Reason: Keep Vein Open


Sodium Chloride (Saline Flush)  2.5 ml FLUSH ASDIRECTED PRN


   PRN Reason: Keep Vein Open








Labs: 


 Laboratory Tests











  02/27/18 02/27/18 02/27/18 Range/Units





  19:55 19:55 19:55 


 


WBC  9.19    (4.0-11.0)  K/uL


 


RBC  4.80    (4.30-5.90)  M/uL


 


Hgb  13.4    (12.0-16.0)  g/dL


 


Hct  41.7    (36.0-46.0)  %


 


MCV  86.9    (80.0-98.0)  fL


 


MCH  27.9    (27.0-32.0)  pg


 


MCHC  32.1    (31.0-37.0)  g/dL


 


RDW Std Deviation  49.4    (28.0-62.0)  fl


 


RDW Coeff of Castillo  16 H    (11.0-15.0)  %


 


Plt Count  280    (150-400)  K/uL


 


MPV  9.80    (7.40-12.00)  fL


 


Neut % (Auto)  80.9 H    (48.0-80.0)  %


 


Lymph % (Auto)  8.9 L    (16.0-40.0)  %


 


Mono % (Auto)  8.6    (0.0-15.0)  %


 


Eos % (Auto)  1.3    (0.0-7.0)  %


 


Baso % (Auto)  0.3    (0.0-1.5)  %


 


Neut # (Auto)  7.4 H    (1.4-5.7)  K/uL


 


Lymph # (Auto)  0.8    (0.6-2.4)  K/uL


 


Mono # (Auto)  0.8    (0.0-0.8)  K/uL


 


Eos # (Auto)  0.1    (0.0-0.7)  K/uL


 


Baso # (Auto)  0.0    (0.0-0.1)  K/uL


 


Nucleated RBC %  0.0    /100WBC


 


Nucleated RBCs #  0    K/uL


 


INR   2.25   


 


Sodium    142  (136-146)  mmol/L


 


Potassium    4.0  (3.5-5.1)  mmol/L


 


Chloride    104  ()  mmol/L


 


Carbon Dioxide    25  (21-31)  mmol/L


 


BUN    25 H  (6.0-23.0)  mg/dL


 


Creatinine    1.1  (0.6-1.5)  mg/dL


 


Est Cr Clr Drug Dosing    44.03  mL/min


 


Estimated GFR (MDRD)    49.8  ml/min


 


Glucose    140 H  ()  mg/dL


 


Calcium    10.1  (8.8-10.8)  mg/dL


 


Total Bilirubin    1.6 H  (0.1-1.5)  mg/dL


 


AST    1195 H  (5-40)  IU/L


 


ALT    610 H  (8-54)  IU/L


 


Alkaline Phosphatase    379 H  ()  


 


Total Protein    7.3  (6.0-8.0)  g/dL


 


Albumin    4.3  (3.4-4.8)  g/dL


 


Globulin    3.0  (2.0-3.5)  g/dL


 


Albumin/Globulin Ratio    1.4  (1.3-2.8)  


 


Lipase    58  (7-80)  U/L











Meds: 


Medications











Generic Name Dose Route Start Last Admin





  Trade Name Antwan  PRN Reason Stop Dose Admin


 


Sodium Chloride  10 ml  02/27/18 20:18  





  Saline Flush  FLUSH   





  ASDIRECTED PRN   





  Keep Vein Open   


 


Sodium Chloride  2.5 ml  02/27/18 20:18  





  Saline Flush  FLUSH   





  ASDIRECTED PRN   





  Keep Vein Open   














Discontinued Medications














Generic Name Dose Route Start Last Admin





  Trade Name Antwan  PRN Reason Stop Dose Admin


 


Sodium Chloride  1,000 mls @ 999 mls/hr  02/27/18 20:19  02/27/18 20:30





  Normal Saline  IV  02/27/18 21:19  999 mls/hr





  STAT ONE   Administration


 


Ondansetron HCl  4 mg  02/27/18 20:19  02/27/18 20:30





  Zofran  IVPUSH  02/27/18 20:20  4 mg





  ONETIME ONE   Administration














Departure





- Departure


Time of Disposition: 21:32


Disposition: Home, Self-Care 01


Condition: Good


Clinical Impression: 


 Metastatic cancer, Dehydration, Vomiting








- Discharge Information


Referrals: 


Lg Centeno MD [Primary Care Provider] - 


Forms:  ED Department Discharge





- My Orders


Last 24 Hours: 


My Active Orders





02/27/18 20:18


Cardiac Monitoring [RC] .AS DIRECTED 


EKG Documentation Completion [RC] STAT 


Pulse Oximetry [RC] ASDIRECTED 


UA W/MICROSCOPIC [URIN] Stat 


Sodium Chloride 0.9% [Saline Flush]   10 ml FLUSH ASDIRECTED PRN 


Sodium Chloride 0.9% [Saline Flush]   2.5 ml FLUSH ASDIRECTED PRN 


Saline Lock Insert [OM.PC] Stat 





02/27/18 20:19


Abdomen Series w Chest 1V [CR] Stat 














- Assessment/Plan


Last 24 Hours: 


My Active Orders





02/27/18 20:18


Cardiac Monitoring [RC] .AS DIRECTED 


EKG Documentation Completion [RC] STAT 


Pulse Oximetry [RC] ASDIRECTED 


UA W/MICROSCOPIC [URIN] Stat 


Sodium Chloride 0.9% [Saline Flush]   10 ml FLUSH ASDIRECTED PRN 


Sodium Chloride 0.9% [Saline Flush]   2.5 ml FLUSH ASDIRECTED PRN 


Saline Lock Insert [OM.PC] Stat 





02/27/18 20:19


Abdomen Series w Chest 1V [CR] Stat

## 2018-02-28 VITALS — SYSTOLIC BLOOD PRESSURE: 102 MMHG | DIASTOLIC BLOOD PRESSURE: 65 MMHG

## 2018-02-28 LAB
CHLORIDE SERPL-SCNC: 110 MMOL/L (ref 98–110)
SODIUM SERPL-SCNC: 144 MMOL/L (ref 136–146)

## 2018-02-28 RX ADMIN — METRONIDAZOLE SCH MLS/HR: 500 SOLUTION INTRAVENOUS at 13:34

## 2018-02-28 RX ADMIN — METRONIDAZOLE SCH MLS/HR: 500 SOLUTION INTRAVENOUS at 17:26

## 2018-02-28 NOTE — CR
EXAM DATE: 18



PATIENT'S AGE: 63





Patient: FRANCESCA MARLEY



Facility: [place], [place]

Patient ID: [id number]

Site Patient ID: [id number].

Site Accession #: [id number]

: 1955

Study: XRay Chest/Abd/Pelvis acute series [id number]/27/2018 9:05:33 PM

Ordering Physician: Giovanny Rivera



Final Report: 

Indication:

Vomiting



Technique:

Supine and upright views of the abdomen with frontal chest. 



Comparison:

Correlation with CT abdomen pelvis 2018



Findings: :

Soft tissues: No suspicious calcifications to suggest kidney or ureteral 
stones. No sign of free air. No sign of soft tissue mass. 

Bowel: Bowel pattern is within normal limits.

Bones: Unremarkable for age.

Chest: Normal cardiomediastinal silhouette. Left-sided Port-A-Cath tip 
terminates at the level of the distal superior vena cava. Minimal linear 
atelectasis or scarring in the left mid lung. Calcified granuloma in the right 
upper lobe. Remote right posterior 6th and 7th rib fractures. 



Impression: :

No acute intra-abdominal process identified. 

Minimal linear atelectasis or scarring in the left mid lung.





Dictated by Geneva Shafer MD @ 2018 9:05PM

(Electronic Signature)



Report Signed by Proxy.
RIVAS

## 2018-02-28 NOTE — CT
CT of the abdomen and pelvis with contrast.

 

HISTORY: Pain

 

TECHNIQUE: Axial CT images were obtained of the abdomen and pelvis following administration of 100 mL
 of Isovue-370 in the right antecubital fossa without complication. Coronal and sagittal reconstructi
ons obtained.

 

Comparison: 5/9/2017.

 

FINDINGS: 

The lung bases are clear, no pleural effusion.

 

There are a few tiny hypodensities within the right hepatic lobe, unchanged. There is stable nodular 
thickening of the right adrenal gland measuring roughly 2.5 x 1.7 cm. The pancreas is normal. Stable 
splenic hypodensity posteriorly. No bulky retroperitoneal lymphadenopathy. There is moderate perichol
ecystic fluid noted. Common bile duct measures 7 mm, previously measuring 4 mm.

 

The kidneys enhance and function symmetrically without evidence of obstructive uropathy. Renal cortic
al cysts are noted bilaterally.

 

The large and small bowel are normal in caliber without evidence of obstruction. No focal pericolonic
 inflammation or stranding. Ileocolonic anastomosis. No bulky pelvic lymphadenopathy or free pelvic f
luid. The urinary bladder is normal.

 

No suspicious osseous abnormalities.

 

IMPRESSION: 

1. Moderate pericholecystic fluid, correlate clinically for cholecystitis.

2. Mildly increased prominence of the common bile duct relative to the previous examination of the de
finite filling defect. 

3. Stable hepatic and splenic hypodensities.

## 2018-02-28 NOTE — PCM.DCSUM1
**Discharge Summary





- Hospital Course


HPI Initial Comments: 


Discharge Summary


Date of admission: 2/27/2018


Date of discharge: 2/28/2018





Admitting diagnosis:


#1. Intractable nausea/vomiting/dehydration


#2. Elevated LFT function tests 


#3. Subtherapeutic INR patient on Coumadin therapy secondary to history of PE


#4. Significant past medical history of metastatic small cell carcinoma of the 

lung on palliative chemotherapy, hypertension, hypothyroidism, history of PE


#5. 





Discharge diagnoses:


#1. Intractable nausea/vomiting/dehydration now resolved


#2. Elevated LFT function likely secondary to cholecystitis along with common 

bile duct obstruction with a 7 mm dilation which is a change from initial 4 mm 

seen in previous imaging


#3. Therapeutic INR on Coumadin secondary to history of PE


#4. Significant past medical history of metastatic small cell carcinoma the 

lung on palliative chemotherapy, hypertension, hypothyroidism, history of PE


#5. 





Consultations: Gen. Surgery





Procedures: None





Hospitalization course: Patient was admitted on 2/27/2018 secondary to 

intractable vomiting and dehydration found to have an elevation of her liver 

function tests with a AST of greater than thousand which is significant as self-

referral days prior the patient had a normal LFT function tests done. Patient 

was IV fluid resuscitated, patient put on Zofran for nausea and vomiting 

symptoms, patient progressed well from a intractable nausea and vomiting 

standpoint and by 2/20/2018 her nausea/vomiting improved to the point where she 

is no longer having those symptoms and is able to tolerate a regular diet. A CT 

scan of the abdomen showed moderate pericholecystic fluid likely clinically 

correlated to her cholecystitis, as well as mildly increased prominence of the 

common bile duct relative to previous examination with definitive filling defect

, common bile duct currently 7 mm on previous imaging was seen to be 4 mm. As a 

result of this the patient was started on metronidazole and ciprofloxacin, and 

I spoke with Dr. Kimbrough general surgery here recommended the patient be 

transferred for an ERCP and possible same stay cholecystectomy. Spoke with GI 

in [place] in [place] who agreed to accept the patient for an ERCP as well as 

the hospitalist team in [place] who accepted the patient to be transferred 

tonight. We did not do any agents for her Coumadin/therapeutic INR in the 

facility. I have already spoken with Dr. Pond in [place] who has stated that 

she will do the reversal of the Coumadin therapy once the patient is 

transferred over.


Disposition on discharge: Transferred to [facility]


Condition on discharge: Stable


Discharge medications: Continuation of home medication aside from Coumadin, 

continuation of antibiotic therapy initiated in the hospital including 

ciprofloxacin as well as metronidazole IV.











- Discharge Data


Discharge Date: 02/28/18


Discharge Disposition: DC/Tfer to Acute Hospital 02


Condition: Fair





- Patient Instructions


Diet: Regular Diet as Tolerated


Activity: As Tolerated


Driving: Do Not Drive


Showering/Bathing: May Shower





- Discharge Plan


Home Medications: 


 Home Meds





Carvedilol 6.25 mg PO BID 01/09/17 [History]


Benzonatate 200 mg PO TID PRN 07/06/17 [History]


Levothyroxine Sodium 100 mcg PO DAILY 07/06/17 [History]


Magnesium Oxide 800 mg PO TID 07/06/17 [History]


Omeprazole 20 mg PO ACBREAKFAST 07/06/17 [History]


Multivitamins [Childrens Chewable Vitamin] 0 mg PO DAILY 02/27/18 [History]


Warfarin [Coumadin] 7.5 mg PO MOWEFR@1400 02/27/18 [History]


Warfarin [Coumadin] 10 mg PO SUTUTHSA@1400 02/27/18 [History]








Forms:  ED Department Discharge


Referrals: 


Lg Centeno MD [Primary Care Provider] - 





- Discharge Summary/Plan Comment


DC Time >30 min.: No





- Patient Data


Vitals - Most Recent: 


 Last Vital Signs











Temp  36.4 C   02/28/18 16:00


 


Pulse  62   02/28/18 16:00


 


Resp  16   02/28/18 16:00


 


BP  93/53 L  02/28/18 16:00


 


Pulse Ox  92 L  02/28/18 16:00











Weight - Most Recent: 68.3 kg


I&O - Last 24 hours: 


 Intake & Output











 02/28/18 02/28/18 02/28/18





 06:59 14:59 22:59


 


Intake Total 300 1400 2625


 


Output Total 250  2550


 


Balance 50 1400 75











Lab Results - Last 24 hrs: 


 Laboratory Results - last 24 hr











  02/27/18 02/28/18 02/28/18 Range/Units





  21:35 05:21 05:21 


 


WBC   5.17   (4.0-11.0)  K/uL


 


RBC   4.08 L   (4.30-5.90)  M/uL


 


Hgb   11.3 L   (12.0-16.0)  g/dL


 


Hct   35.7 L   (36.0-46.0)  %


 


MCV   87.5   (80.0-98.0)  fL


 


MCH   27.7   (27.0-32.0)  pg


 


MCHC   31.7   (31.0-37.0)  g/dL


 


RDW Std Deviation   50.2   (28.0-62.0)  fl


 


RDW Coeff of Castillo   16 H   (11.0-15.0)  %


 


Plt Count   229   (150-400)  K/uL


 


MPV   9.60   (7.40-12.00)  fL


 


Add Manual Diff   YES   


 


Neutrophils % (Manual)   65   (48.0-80.0)  %


 


Band Neutrophils %   3   %


 


Lymphocytes % (Manual)   28   (16.0-40.0)  %


 


Monocytes % (Manual)   2   (0.0-15.0)  %


 


Eosinophils % (Manual)   1   (0.0-7.0)  %


 


Basophils % (Manual)   1   (0.0-1.5)  %


 


Nucleated RBC %   0.0   /100WBC


 


Absolute Seg Neuts   3.4   (1.4-5.7)  


 


Band Neutrophils #   0.2   


 


Lymphocytes # (Manual)   1.4   (0.6-2.4)  


 


Monocytes # (Manual)   0.1   (0.0-0.8)  


 


Eosinophils # (Manual)   0.1   (0.0-0.7)  


 


Basophils # (Manual)   0.1   (0.0-0.1)  


 


Nucleated RBCs #   0   K/uL


 


INR     


 


Sodium    144  (136-146)  mmol/L


 


Potassium    3.9  (3.5-5.1)  mmol/L


 


Chloride    110  ()  mmol/L


 


Carbon Dioxide    27  (21-31)  mmol/L


 


BUN    20  (6.0-23.0)  mg/dL


 


Creatinine    0.9  (0.6-1.5)  mg/dL


 


Est Cr Clr Drug Dosing    53.81  mL/min


 


Estimated GFR (MDRD)    > 60.0  ml/min


 


Glucose    87  ()  mg/dL


 


Calcium    9.0  (8.8-10.8)  mg/dL


 


Phosphorus    3.3  (2.4-4.7)  mg/dL


 


Magnesium    1.4 L  (1.5-2.3)  mEq/L


 


Total Bilirubin    2.1 H  (0.1-1.5)  mg/dL


 


AST    941 H  (5-40)  IU/L


 


ALT    730 H  (8-54)  IU/L


 


Alkaline Phosphatase    348 H  ()  


 


Total Protein    5.6 L  (6.0-8.0)  g/dL


 


Albumin    3.4  (3.4-4.8)  g/dL


 


Globulin    2.2  (2.0-3.5)  g/dL


 


Albumin/Globulin Ratio    1.6  (1.3-2.8)  


 


Urine Color  YELLOW    


 


Urine Appearance  SLT CLOUDY    


 


Urine pH  6.5    (5.0-8.0)  


 


Ur Specific Gravity  1.010    (1.001-1.035)  


 


Urine Protein  NEGATIVE    (NEGATIVE)  mg/dL


 


Urine Glucose (UA)  NEGATIVE    (NEGATIVE)  mg/dL


 


Urine Ketones  NEGATIVE    (NEGATIVE)  mg/dL


 


Urine Occult Blood  NEGATIVE    (NEGATIVE)  


 


Urine Nitrite  NEGATIVE    (NEGATIVE)  


 


Urine Bilirubin  NEGATIVE    (NEGATIVE)  


 


Urine Urobilinogen  0.2    (<2.0)  EU/dL


 


Ur Leukocyte Esterase  NEGATIVE    (NEGATIVE)  


 


Urine RBC  1-2    (0-2/HPF)  


 


Urine WBC  0-2    (0-5/HPF)  


 


Ur Epithelial Cells  FEW    (NONE-FEW)  


 


Amorphous Sediment  LIGHT    (NEGATIVE)  


 


Urine Bacteria  FEW    (NEGATIVE)  














  02/28/18 Range/Units





  05:21 


 


WBC   (4.0-11.0)  K/uL


 


RBC   (4.30-5.90)  M/uL


 


Hgb   (12.0-16.0)  g/dL


 


Hct   (36.0-46.0)  %


 


MCV   (80.0-98.0)  fL


 


MCH   (27.0-32.0)  pg


 


MCHC   (31.0-37.0)  g/dL


 


RDW Std Deviation   (28.0-62.0)  fl


 


RDW Coeff of Castillo   (11.0-15.0)  %


 


Plt Count   (150-400)  K/uL


 


MPV   (7.40-12.00)  fL


 


Add Manual Diff   


 


Neutrophils % (Manual)   (48.0-80.0)  %


 


Band Neutrophils %   %


 


Lymphocytes % (Manual)   (16.0-40.0)  %


 


Monocytes % (Manual)   (0.0-15.0)  %


 


Eosinophils % (Manual)   (0.0-7.0)  %


 


Basophils % (Manual)   (0.0-1.5)  %


 


Nucleated RBC %   /100WBC


 


Absolute Seg Neuts   (1.4-5.7)  


 


Band Neutrophils #   


 


Lymphocytes # (Manual)   (0.6-2.4)  


 


Monocytes # (Manual)   (0.0-0.8)  


 


Eosinophils # (Manual)   (0.0-0.7)  


 


Basophils # (Manual)   (0.0-0.1)  


 


Nucleated RBCs #   K/uL


 


INR  2.41  


 


Sodium   (136-146)  mmol/L


 


Potassium   (3.5-5.1)  mmol/L


 


Chloride   ()  mmol/L


 


Carbon Dioxide   (21-31)  mmol/L


 


BUN   (6.0-23.0)  mg/dL


 


Creatinine   (0.6-1.5)  mg/dL


 


Est Cr Clr Drug Dosing   mL/min


 


Estimated GFR (MDRD)   ml/min


 


Glucose   ()  mg/dL


 


Calcium   (8.8-10.8)  mg/dL


 


Phosphorus   (2.4-4.7)  mg/dL


 


Magnesium   (1.5-2.3)  mEq/L


 


Total Bilirubin   (0.1-1.5)  mg/dL


 


AST   (5-40)  IU/L


 


ALT   (8-54)  IU/L


 


Alkaline Phosphatase   ()  


 


Total Protein   (6.0-8.0)  g/dL


 


Albumin   (3.4-4.8)  g/dL


 


Globulin   (2.0-3.5)  g/dL


 


Albumin/Globulin Ratio   (1.3-2.8)  


 


Urine Color   


 


Urine Appearance   


 


Urine pH   (5.0-8.0)  


 


Ur Specific Gravity   (1.001-1.035)  


 


Urine Protein   (NEGATIVE)  mg/dL


 


Urine Glucose (UA)   (NEGATIVE)  mg/dL


 


Urine Ketones   (NEGATIVE)  mg/dL


 


Urine Occult Blood   (NEGATIVE)  


 


Urine Nitrite   (NEGATIVE)  


 


Urine Bilirubin   (NEGATIVE)  


 


Urine Urobilinogen   (<2.0)  EU/dL


 


Ur Leukocyte Esterase   (NEGATIVE)  


 


Urine RBC   (0-2/HPF)  


 


Urine WBC   (0-5/HPF)  


 


Ur Epithelial Cells   (NONE-FEW)  


 


Amorphous Sediment   (NEGATIVE)  


 


Urine Bacteria   (NEGATIVE)  











Med Orders - Current: 


 Current Medications





Carvedilol (Coreg)  6.25 mg PO BID [facility]


   Last Admin: 02/28/18 09:35 Dose:  6.25 mg


Metronidazole 500 mg/ Premix  100 mls @ 100 mls/hr IV QID [facility]


   Last Admin: 02/28/18 17:26 Dose:  100 mls/hr


Ciprofloxacin/Dextrose 400 mg/ (Premix)  200 mls @ 200 mls/hr IV Q12H [facility]


   Last Admin: 02/28/18 12:10 Dose:  200 mls/hr


Levothyroxine Sodium (Synthroid)  100 mcg PO ACBREAKFAST [facility]


   Last Admin: 02/28/18 06:37 Dose:  100 mcg


Magnesium Oxide (Magnesium Oxide)  800 mg PO TID [facility]


   Last Admin: 02/28/18 14:18 Dose:  800 mg


Morphine Sulfate (Morphine)  2 mg IVPUSH Q2H PRN


   PRN Reason: Pain (severe 7-10)


   Stop: 02/28/18 22:54


Ondansetron HCl (Zofran Odt)  4 mg PO Q4H PRN


   PRN Reason: nausea, able to take PO


Ondansetron HCl (Zofran)  4 mg IVPUSH Q4H PRN


   PRN Reason: Nausea


Oxycodone HCl (Oxycodone)  5 mg PO Q4H PRN


   PRN Reason: Pain (moderate 4-6)


Pantoprazole Sodium (Protonix Iv***)  40 mg IVPUSH DAILY [facility]


   Last Admin: 02/28/18 09:37 Dose:  40 mg


Sodium Chloride (Saline Flush)  10 ml FLUSH ASDIRECTED PRN


   PRN Reason: Keep Vein Open


Sodium Chloride (Saline Flush)  2.5 ml FLUSH ASDIRECTED PRN


   PRN Reason: Keep Vein Open


Warfarin Sodium (Coumadin)  7.5 mg PO MoWeFr@1400 [facility]


   Last Admin: 02/28/18 14:17 Dose:  7.5 mg


Warfarin Sodium (Coumadin)  10 mg PO SuTuThSa@1400 [facility]





Discontinued Medications





Sodium Chloride (Normal Saline)  1,000 mls @ 999 mls/hr IV STAT ONE


   Stop: 02/27/18 21:19


   Last Admin: 02/27/18 20:30 Dose:  999 mls/hr


Sodium Chloride (Normal Saline)  1,000 mls @ 125 mls/hr IV ASDIRECTED [facility]


   Last Admin: 02/28/18 09:40 Dose:  125 mls/hr


Magnesium Sulfate 4 gm/ Premix  100 mls @ 25 mls/hr IV ONETIME ONE


   Stop: 02/28/18 13:12


   Last Admin: 02/28/18 09:37 Dose:  25 mls/hr


Ciprofloxacin/Dextrose 400 mg/ (Premix)  200 mls @ 200 mls/hr IV Q12H [facility]


Iopamidol (Isovue Multipack-370 (76%))  100 ml IVPUSH ONETIME STA


   Stop: 02/28/18 08:29


   Last Admin: 02/28/18 09:02 Dose:  100 ml


Omeprazole (Omeprazole)  20 mg PO ACBREAKFAST [facility]


Ondansetron HCl (Zofran)  4 mg IVPUSH ONETIME ONE


   Stop: 02/27/18 20:20


   Last Admin: 02/27/18 20:30 Dose:  4 mg











*Q Meaningful Use (DIS)





- VTE *Q


VTE Criteria *Q: 








- Stroke *Q


Stroke Criteria *Q: 








- AMI *Q


AMI Criteria *Q:

## 2018-03-01 NOTE — US
EXAM DATE: 18



PATIENT'S AGE: 63





Patient: FRANCESCA MARLEY



Facility: [place], [place]

Patient ID: [id number]

Site Patient ID: [id number].

Site Accession #: [id number].

: 1955

Study: US Abdomen [id number]/28/2018 7:09:28 PM

Ordering Physician: Mahad Carrasco



Final Report: 

INDICATION: 

pain



TECHNIQUE:

Ultrasound abdomen limited. Sonographic images of the right upper quadrant were 
obtained using gray-scale and color Doppler images.



COMPARISON:

None 



FINDINGS:

Liver: Normal in size and echotexture. No masses. No intrahepatic biliary 
dilatation. 

Gallbladder: Cholelithiasis. No pericholecystic fluid. Gallbladder wall 
thickening measuring up to 4 mm. 

Common bile duct: 7 mm. 

Pancreas: The imaged portion is unremarkable. 

Right kidney: 10 cm. Normal echotexture and cortex. No masses, stones, or 
hydronephrosis. 

Vasculature: Proximal abdominal aorta and IVC are normal. 



IMPRESSION:

Cholelithiasis with gallbladder wall thickening. Findings suggestive for acute 
cholecystitis. .



Dictated by Greg Cisse MD @ 2018 7:38:05 PM





Dictated by: Greg Cisse MD @ 2018 19:38:33

(Electronic Signature)



Report Signed by Proxy.
[facility]

## 2018-04-09 ENCOUNTER — HOSPITAL ENCOUNTER (EMERGENCY)
Dept: HOSPITAL 56 - MW.ED | Age: 63
Discharge: HOME | End: 2018-04-09
Payer: MEDICARE

## 2018-04-09 VITALS — SYSTOLIC BLOOD PRESSURE: 126 MMHG | DIASTOLIC BLOOD PRESSURE: 87 MMHG
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
## 2018-04-09 DIAGNOSIS — K21.9: ICD-10-CM

## 2018-04-09 DIAGNOSIS — Z79.899: ICD-10-CM

## 2018-04-09 DIAGNOSIS — F17.210: ICD-10-CM

## 2018-04-09 DIAGNOSIS — M54.5: Primary | ICD-10-CM

## 2018-04-09 DIAGNOSIS — I10: ICD-10-CM

## 2018-04-09 PROCEDURE — 99283 EMERGENCY DEPT VISIT LOW MDM: CPT

## 2018-04-09 PROCEDURE — 72170 X-RAY EXAM OF PELVIS: CPT

## 2018-04-09 PROCEDURE — 72100 X-RAY EXAM L-S SPINE 2/3 VWS: CPT

## 2018-04-09 NOTE — CR
EXAM DATE: 18



PATIENT'S AGE: 63





Patient: FRANCESCA MARLEY



Facility: [place], [place]

Patient ID: [id number]

Site Patient ID: [id number].

Site Accession #: [id number].

: 1955

Study: XRay Spine Lumbar [id number]/9/2018 3:18:32 AM

Ordering Physician: Doctor Sainz



Final Report: 

INDICATION:

Fall, low back pain 



TECHNIQUE:

Lumbar spine 3 view.



COMPARISON:

None



FINDINGS:

Bones: There is mild height loss of L1 and L2 vertebrae. 

Joints: Moderate multilevel degenerative disc and facet disease. 

Soft tissues: Aortic wall calcifications noted. 



IMPRESSION:

Mild height loss of the L1 and L2 vertebrae, new compared to 2018. 
Consider CT lumbar spine for further evaluation if clinically indicated.





Dictated by Geneva Shafer MD @ 2018 3:37AM

(Electronic Signature)



Report Signed by Proxy.
[facility]

## 2018-04-09 NOTE — EDM.PDOC
ED HPI GENERAL MEDICAL PROBLEM





- General


Chief Complaint: Back Pain or Injury


Stated Complaint: BACK PAIN


Time Seen by Provider: 04/09/18 02:52


Source of Information: Reports: Patient





- History of Present Illness


INITIAL COMMENTS - FREE TEXT/NARRATIVE: 





HISTORY AND PHYSICAL:


History of present illness:


[Patient presents via private vehicle





Patient has a known history of lung cancer metastasis to brain making her 

unsteady on her feet, hence, she is generally in a wheelchair. Saturday night 

she did ambulate with walker in her home and had a fall as she became unsteady 

on her feet.   she fell straight down to her bottom no head injury or loss of 

consciousness she complains now of 10 out of 10 low back pain worsened with 

ambulation better at rest


Pain radiates down her right leg to the level of her knee in a sciatic 

distribution


While lying she is comfortable and in no distress, fall occurred 24 hours prior 

to arrival








]


Review of systems: 


As per history of present illness and below otherwise all systems reviewed and 

negative.


Past medical history: 


As per history of present illness and as reviewed below otherwise 

noncontributory.


Surgical history: 


As per history of present illness and as reviewed below otherwise 

noncontributory.


Social history: 


No reported history of drug or alcohol abuse.


Family history: 


As per history of present illness and as reviewed below otherwise 

noncontributory.








Physical exam:


HEENT: Atraumatic, normocephalic, pupils reactive, negative for conjunctival 

pallor or scleral icterus, mucous membranes moist, throat clear, neck supple, 

nontender, trachea midline.


Lungs: Clear to auscultation, breath sounds equal bilaterally, chest nontender.


Heart: S1S2, regular, negative for clicks, rubs, or JVD.


Abdomen: Soft, nondistended, nontender. Negative for masses or 

hepatosplenomegaly. Negative for costovertebral tenderness.


Pelvis: Stable nontender.


Genitourinary: Deferred.


Rectal: Deferred.


Extremities: Atraumatic, negative for cords or calf pain. Neurovascular 

unremarkable.


Neuro: Awake, alert, oriented. Cranial nerves II through XII unremarkable. 

Cerebellum unremarkable. Motor and sensory unremarkable throughout. Exam 

nonfocal.








Diagnostics:


[Pelvis


Lumbar spine


]


Therapeutics:


[Tramadol 50 mg by mouth now 3 times a day when necessary #30 no refill


]


Impression: 


[: fall in Home


Low back pain


 mild height loss of L1 and L2 vertebrae compared to January 2, 2018 


Chronic history of baseline  ]


Definitive disposition and diagnosis as appropriate pending reevaluation and 

review of above.


  ** low back


Pain Score (Numeric/FACES): 10





- Related Data


 Allergies











Allergy/AdvReac Type Severity Reaction Status Date / Time


 


No Known Allergies Allergy   Verified 04/09/18 02:32











Home Meds: 


 Home Meds





Carvedilol 6.25 mg PO BID 01/09/17 [History]


Benzonatate 200 mg PO ASDIRECTED PRN 07/06/17 [History]


Levothyroxine Sodium 100 mcg PO DAILY 07/06/17 [History]


Magnesium Oxide 800 mg PO TID 07/06/17 [History]


Omeprazole 20 mg PO ACBREAKFAST 07/06/17 [History]


Multivitamins [Childrens Chewable Vitamin] 0 mg PO DAILY 02/27/18 [History]


Warfarin [Coumadin] 7.5 mg PO MOWEFR@1400 02/27/18 [History]


Warfarin [Coumadin] 10 mg PO SUTUTHSA@1400 02/27/18 [History]











Past Medical History


HEENT History: Reports: Impaired Vision


Other HEENT History: wears glasses sometimes


Cardiovascular History: Reports: Hypertension


Other Cardiovascular History: not currently taking medication


Respiratory History: Reports: Other (See Below)


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 

the brain


Gastrointestinal History: Reports: GERD


Genitourinary History: Reports: Other (See Below)


Other Genitourinary History: Renal failure


OB/GYN History: Reports: Pregnancy


Musculoskeletal History: Reports: Fracture


Other Musculoskeletal History: fx left ankle


Neurological History: Reports: Vertigo, Other (See Below)


Other Neuro History: dizziness; Lung mets to the brain


Psychiatric History: Reports: None


Endocrine/Metabolic History: Reports: Hypothyroidism


Hematologic History: Reports: None


Other Hematologic History: son states her blood has been slow to clot recently


Immunologic History: Reports: None


Oncologic (Cancer) History: Reports: Lung


Other Oncologic History: lung cancer is metastisized to brain


Dermatologic History: Reports: None





- Infectious Disease History


Infectious Disease History: Reports: Chicken Pox





- Past Surgical History


HEENT Surgical History: Reports: None


Cardiovascular Surgical History: Reports: Other (See Below)


Other Cardiovascular Surgeries/Procedures: portacath


Respiratory Surgical History: Reports: None


Female  Surgical History: Reports: None


Musculoskeletal Surgical History: Reports: None


Oncologic Surgical History: Reports: Other (See Below)





Social & Family History





- Family History


Family Medical History: Noncontributory





- Tobacco Use


Smoking Status *Q: Current Every Day Smoker


Years of Tobacco use: 30


Packs/Tins Daily: 1


Used Tobacco, but Quit: Yes


Month/Year Tobacco Last Used: 2years


Second Hand Smoke Exposure: No





- Caffeine Use


Caffeine Use: Reports: Soda





- Recreational Drug Use


Recreational Drug Use: No





- Living Situation & Occupation


Living situation: Reports: , with Family





ED ROS GENERAL





- Review of Systems


Review Of Systems: ROS reveals no pertinent complaints other than HPI.





ED EXAM, GENERAL





- Physical Exam


Exam: See Below





Course





- Vital Signs


Last Recorded V/S: 


 Last Vital Signs











Temp  98 F   04/09/18 03:17


 


Pulse  78   04/09/18 03:17


 


Resp  24 H  04/09/18 02:08


 


BP  116/88   04/09/18 03:17


 


Pulse Ox  93 L  04/09/18 03:17














- Orders/Labs/Meds


Orders: 


 Active Orders 24 hr











 Category Date Time Status


 


 Lumbar Spine 2 or 3V [CR] Stat Exams  04/09/18 02:52 Taken


 


 Pelvis 1V or 2V [CR] Stat Exams  04/09/18 02:52 Taken


 


 traMADol [Ultram] Med  04/09/18 04:02 Once





 50 mg PO ONETIME ONE   














Departure





- Departure


Time of Disposition: 04:04


Disposition: Home, Self-Care 01


Condition: Good


Clinical Impression: 


 Low back pain








- Discharge Information


Referrals: 


Lg Centeno MD [Primary Care Provider] - 


Forms:  ED Department Discharge


Additional Instructions: 


Medication as prescribed


Return if symptoms persist or worsen


Follow-up with primary care in 2 weeks sooner as needed





The following information is given to patients seen in the emergency department 

who are being discharged to home. This information is to outline your options 

for follow-up care. We provide all patients seen in our emergency department 

with a follow-up referral.





The need for follow-up, as well as the timing and circumstances, are variable 

depending upon the specifics of your emergency department visit.





If you don't have a primary care physician on staff, we will provide you with a 

referral. We always advise you to contact your personal physician following an 

emergency department visit to inform them of the circumstance of the visit and 

for follow-up with them and/or the need for any referrals to a consulting 

specialist.





The emergency department will also refer you to a specialist when appropriate. 

This referral assures that you have the opportunity for follow-up care with a 

specialist. All of these measure are taken in an effort to provide you with 

optimal care, which includes your follow-up.





Under all circumstances we always encourage you to contact your private 

physician who remains a resource for coordinating your care. When calling for 

follow-up care, please make the office aware that this follow-up is from your 

recent emergency room visit. If for any reason you are refused follow-up, 

please contact the [facility] emergency department at [phone] 

and asked to speak to the emergency department charge nurse.








- My Orders


Last 24 Hours: 


My Active Orders





04/09/18 02:52


Lumbar Spine 2 or 3V [CR] Stat 


Pelvis 1V or 2V [CR] Stat 





04/09/18 04:02


traMADol [Ultram]   50 mg PO ONETIME ONE 














- Assessment/Plan


Last 24 Hours: 


My Active Orders





04/09/18 02:52


Lumbar Spine 2 or 3V [CR] Stat 


Pelvis 1V or 2V [CR] Stat 





04/09/18 04:02


traMADol [Ultram]   50 mg PO ONETIME ONE

## 2018-04-09 NOTE — CR
EXAM DATE: 18



PATIENT'S AGE: 63





Patient: FRANCESCA MARLEY



Facility: [place], [place]

Patient ID: [id number]

Site Patient ID: [id number].

Site Accession #: [id number]

: 1955

Study: XRay Pelvis [id number]/9/2018 3:18:20 AM

Ordering Physician: Doctor Sainz



Final Report: 

Indication:

Fall 



Technique:

Frontal view pelvis



Comparison:

CT pelvis 2018



Findings:

Bones: Alignment is normal. No fractures or bone lesions. 

Joint spaces: Moderate degenerative changes in both hip joints. Degenerative 
disc disease in the lower lumbar spine 

Soft tissues: Unremarkable. 



Impression:

No acute abnormality.





Dictated by Geneva [facility] MD @ 2018 3:34AM

(Electronic Signature)



Report Signed by Proxy.
[facility]AS

## 2018-05-17 ENCOUNTER — HOSPITAL ENCOUNTER (EMERGENCY)
Dept: HOSPITAL 56 - MW.ED | Age: 63
Discharge: OTHER INSTITUTION | End: 2018-05-17
Payer: MEDICARE

## 2018-05-17 VITALS — DIASTOLIC BLOOD PRESSURE: 65 MMHG | SYSTOLIC BLOOD PRESSURE: 109 MMHG

## 2018-05-17 DIAGNOSIS — S82.841A: Primary | ICD-10-CM

## 2018-05-17 DIAGNOSIS — S82.51XA: ICD-10-CM

## 2018-05-17 DIAGNOSIS — S82.431A: ICD-10-CM
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
## 2018-05-17 LAB
CHLORIDE SERPL-SCNC: 107 MMOL/L (ref 98–107)
SODIUM SERPL-SCNC: 141 MMOL/L (ref 136–145)

## 2018-05-17 NOTE — EDM.PDOC
ED HPI GENERAL MEDICAL PROBLEM





- General


Chief Complaint: Lower Extremity Injury/Pain


Stated Complaint: FALL/PAIN RT ANKLE


Time Seen by Provider: 05/17/18 20:09


Source of Information: Reports: Patient


History Limitations: Reports: No Limitations





- History of Present Illness


INITIAL COMMENTS - FREE TEXT/NARRATIVE: 





HISTORY AND PHYSICAL:





History of present illness:


Patient is a 63-year-old female who presents to the emergency room with 

complaints of right ankle pain, swelling and bruising after falling out of her 

wheelchair. Family states she was unwitnessed for approximately 3 hours and her 

concern that she is weak on her right side. Patient states that she has 

progressively felt weak intermittently over the past 6-7 days. She reports this 

was a mechanical fall. Her foot was caught between the wheel of her wheelchair 

and needed assistance getting up. She did not have any chest pain, shortness of 

breath, syncope or dizziness now or during this event. 


He denies any fever, chills, abdominal pain, nausea, vomiting, diarrhea or 

constipation.





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 

the brain; which she receives routine chemotherapy. Family/Patient requesting a 

head CT as she takes Coumadin daily, hx of brain tumor, and concerned about her 

right sided weakness. 


History of anemia (due to chemotherapy), pneumonia, PE, DVT, and left fibular 

fracture.





Review of systems: 


As per history of present illness and below otherwise all systems reviewed and 

negative.





Past medical history: 


As per history of present illness and as reviewed below otherwise 

noncontributory.





Surgical history: 


As per history of present illness and as reviewed below otherwise 

noncontributory.





Social history: 


No reported history of drug or alcohol abuse.





Family history: 


As per history of present illness and as reviewed below otherwise 

noncontributory.





Physical exam:


General: Well-developed and well-nourished 63-year-old female. Alert and 

oriented. Nontoxic appearing and in no acute distress.


HEENT: Atraumatic, normocephalic, pupils equal and reactive bilaterally, 

negative for conjunctival pallor or scleral icterus, mucous membranes moist, 

throat clear, neck supple, nontender, trachea midline. No drooling or trismus 

noted. No meningeal signs


Lungs: Clear to auscultation, breath sounds equal bilaterally, chest nontender.


Heart: S1S2, regular rate and rhythm without overt murmur


Abdomen: Soft, nondistended, nontender. Negative for masses or 

hepatosplenomegaly. Negative for costovertebral tenderness.


Pelvis: Stable nontender.


Genitourinary: Deferred.


Rectal: Deferred.


Skin: Soft tissue swelling with bruising to right lateral/anterior ankle. +

Pedal Pulses bilaterally. Otherwise skin is intact, warm, dry. No lesions or 

rashes noted.


Extremities: Uses wheelchair for ambulation. Moves all extremities per self; 

increased pain with ROM of right ankle flexion/extension. Appears to have no 

achilles involvement, negative for cords or calf pain. Neurovascular 

unremarkable.


Neuro: Awake, alert, oriented. Cranial nerves II through XII unremarkable. 

Cerebellum unremarkable. Motor and sensory unremarkable throughout. Exam 

nonfocal.





Notes:


Patient had chemotherapy this morning. States she has some generalized weakness 

(normal variance). Will perform routine cardiac labs for thoroughness. Her NIH 

scale 0. She declines anything for pain at this time. Elevate and ice the 

affected extremity. 





Xray of the right ankle shows oblique displaced distal right fibular fracture. 

Medial malleolus avulsion fracture.


EKG shows sinus rhythm with rate of 93. CBC shows a Hcgb 10.7, Hct 33.3, 

Troponin 0.06 (H), Bun 25, Creat 1.2. Patient denies any chest pain, SOB, 

diaphoresis or dizziness. Currently our hospital is full and we do not have any 

beds available. Due to her fracture, comorbidities and elevated troponin I did 

request to the patient that we transfer her to the closest facility, [facility]. Patient is reluctant but agreeable. We did discuss her CODE STATUS. She 

would take to be a CODE STATUS 1 and have all measures attempted.





[id number] On hold at [facility] during this time. Unable to reach an ER physician, 

as they are busy. She requests that we call back at [phone] as a 3rd ER provider 

will be coming on shift. 





2210: Dr Willoughby of [facility] has accepted this patient. She will be 

transferred via ground EMS.





Diagnostics:


CBC, CMP, Troponin, EKG, PT/INR, Head CT, Ankle Xray (R)





Therapeutics:


Supportive (ICE/ELEVATION)





Impression: 


Right Ankle bimalleolar fracture


Elevated troponin





Plan:


Ground EMS to [facility]





Definitive disposition and diagnosis as appropriate pending reevaluation and 

review of above.





  ** Right Ankle


Pain Score (Numeric/FACES): 10





- Related Data


 Allergies











Allergy/AdvReac Type Severity Reaction Status Date / Time


 


No Known Allergies Allergy   Verified 05/17/18 20:26











Home Meds: 


 Home Meds





Carvedilol 6.25 mg PO BID 01/09/17 [History]


Benzonatate 200 mg PO ASDIRECTED PRN 07/06/17 [History]


Levothyroxine Sodium 100 mcg PO DAILY 07/06/17 [History]


Magnesium Oxide 800 mg PO TID 07/06/17 [History]


Omeprazole 20 mg PO ACBREAKFAST 07/06/17 [History]


Multivitamins [Childrens Chewable Vitamin] 0 mg PO DAILY 02/27/18 [History]


Warfarin [Coumadin] 7.5 mg PO ASDIRECTED 02/27/18 [History]











Past Medical History


HEENT History: Reports: Impaired Vision


Other HEENT History: wears glasses sometimes


Cardiovascular History: Reports: Hypertension


Other Cardiovascular History: not currently taking medication


Respiratory History: Reports: Other (See Below)


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 

the brain


Gastrointestinal History: Reports: GERD


Genitourinary History: Reports: Other (See Below)


Other Genitourinary History: Renal failure


OB/GYN History: Reports: Pregnancy


Musculoskeletal History: Reports: Fracture


Other Musculoskeletal History: fx left ankle


Neurological History: Reports: Vertigo, Other (See Below)


Other Neuro History: dizziness; Lung mets to the brain


Psychiatric History: Reports: None


Endocrine/Metabolic History: Reports: Hypothyroidism


Hematologic History: Reports: None


Other Hematologic History: son states her blood has been slow to clot recently


Immunologic History: Reports: None


Oncologic (Cancer) History: Reports: Lung


Other Oncologic History: lung cancer is metastisized to brain


Dermatologic History: Reports: None





- Infectious Disease History


Infectious Disease History: Reports: Chicken Pox





- Past Surgical History


HEENT Surgical History: Reports: None


Cardiovascular Surgical History: Reports: Other (See Below)


Other Cardiovascular Surgeries/Procedures: portacath


Respiratory Surgical History: Reports: None


Female  Surgical History: Reports: None


Musculoskeletal Surgical History: Reports: None


Oncologic Surgical History: Reports: Other (See Below)





Social & Family History





- Family History


Family Medical History: Noncontributory





- Caffeine Use


Caffeine Use: Reports: Soda





- Living Situation & Occupation


Living situation: Reports: , with Family





Review of Systems





- Review of Systems


Review Of Systems: ROS reveals no pertinent complaints other than HPI.





ED EXAM, GENERAL





- Physical Exam


Exam: See Below (See dictation)





Course





- Vital Signs


Last Recorded V/S: 


 Last Vital Signs











Temp  97.8 F   05/17/18 21:56


 


Pulse  88   05/17/18 21:56


 


Resp  20   05/17/18 21:56


 


BP  100/62   05/17/18 21:56


 


Pulse Ox  93 L  05/17/18 21:56














- Orders/Labs/Meds


Orders: 


 Active Orders 24 hr











 Category Date Time Status


 


 EKG 12 Lead [EKG Documentation Completion] [RC] STAT Care  05/17/18 20:20 

Active


 


 Ankle Min 3V Rt [CR] Stat Exams  05/17/18 20:09 Taken


 


 Head wo Cont [CT] Stat Exams  05/17/18 20:20 Taken











Labs: 


 Laboratory Tests











  05/17/18 05/17/18 05/17/18 Range/Units





  20:50 20:50 20:50 


 


WBC  7.85    (4.0-11.0)  K/uL


 


RBC  3.74 L    (4.30-5.90)  M/uL


 


Hgb  10.7 L    (12.0-16.0)  g/dL


 


Hct  33.3 L    (36.0-46.0)  %


 


MCV  89.0    (80.0-98.0)  fL


 


MCH  28.6    (27.0-32.0)  pg


 


MCHC  32.1    (31.0-37.0)  g/dL


 


RDW Std Deviation  51.0    (28.0-62.0)  fl


 


RDW Coeff of Castillo  16 H    (11.0-15.0)  %


 


Plt Count  233    (150-400)  K/uL


 


MPV  9.40    (7.40-12.00)  fL


 


Neut % (Auto)  68.4    (48.0-80.0)  %


 


Lymph % (Auto)  13.6 L    (16.0-40.0)  %


 


Mono % (Auto)  14.1    (0.0-15.0)  %


 


Eos % (Auto)  3.4    (0.0-7.0)  %


 


Baso % (Auto)  0.5    (0.0-1.5)  %


 


Neut # (Auto)  5.4    (1.4-5.7)  K/uL


 


Lymph # (Auto)  1.1    (0.6-2.4)  K/uL


 


Mono # (Auto)  1.1 H    (0.0-0.8)  K/uL


 


Eos # (Auto)  0.3    (0.0-0.7)  K/uL


 


Baso # (Auto)  0.0    (0.0-0.1)  K/uL


 


Nucleated RBC %  0.0    /100WBC


 


Nucleated RBCs #  0    K/uL


 


INR   1.61   


 


Sodium    141  (136-145)  mmol/L


 


Potassium    3.6  (3.5-5.1)  mmol/L


 


Chloride    107  ()  mmol/L


 


Carbon Dioxide    26.8  (21.0-32.0)  mmol/L


 


BUN    25 H  (7.0-18.0)  mg/dL


 


Creatinine    1.2 H  (0.6-1.0)  mg/dL


 


Est Cr Clr Drug Dosing    40.36  mL/min


 


Estimated GFR (MDRD)    45.1  ml/min


 


Glucose    147 H  ()  mg/dL


 


Calcium    8.8  (8.5-10.1)  mg/dL


 


Total Bilirubin    0.5  (0.2-1.0)  mg/dL


 


AST    25  (15-37)  IU/L


 


ALT    21  (14-63)  IU/L


 


Alkaline Phosphatase    75  ()  U/L


 


Troponin I    0.060 H*  (0.000-0.056)  ng/mL


 


Total Protein    5.8 L  (6.4-8.2)  g/dL


 


Albumin    3.2 L  (3.4-5.0)  g/dL


 


Globulin    2.6  (2.0-3.5)  g/dL


 


Albumin/Globulin Ratio    1.2 L  (1.3-2.8)  














Departure





- Departure


Time of Disposition: 22:12


Disposition: DC/Tfer to Other [facility]


Clinical Impression: 


 Elevated troponin





Ankle fracture, right


Qualifiers:


 Encounter type: initial encounter Fracture type: closed Qualified Code(s): 

S82.891A - Other fracture of right lower leg, initial encounter for closed 

fracture








- Discharge Information


Referrals: 


Lg Centeno MD [Primary Care Provider] - 


Forms:  ED Department Discharge





- My Orders


Last 24 Hours: 


My Active Orders





05/17/18 20:09


Ankle Min 3V Rt [CR] Stat 





05/17/18 20:20


EKG 12 Lead [EKG Documentation Completion] [RC] STAT 


Head wo Cont [CT] Stat 














- Assessment/Plan


Last 24 Hours: 


My Active Orders





05/17/18 20:09


Ankle Min 3V Rt [CR] Stat 





05/17/18 20:20


EKG 12 Lead [EKG Documentation Completion] [RC] STAT 


Head wo Cont [CT] Stat

## 2018-05-18 NOTE — CT
EXAM DATE: 18



PATIENT'S AGE: 63





Patient: FRANCESCA MARLEY



Facility: [place], [place]

Patient ID: [id number]

Site Patient ID: [id number].

Site Accession #: [id number]

: 1955

Study: CT Head [id number]/17/2018 8:42:36 PM

Ordering Physician: Doctor Temp



Final Report: 

INDICATION:

Fall today, loss of consciousness. History of brain cancer. 



TECHNIQUE:

CT head without i.v. contrast. 



COMPARISON:

Comparison brain MRI dated 02/15/2017. 



FINDINGS:

Moderate degree white matter low attenuation. Likely decreased size of cystic 
mass in the right frontal lobe near the vertex, series 201, image 39. This mass 
noted on earlier MRI on series 601, image 269. 

Coarse calcifications in the left frontal lobe, series 201, image 34. 

No parenchymal, intraventricular, or subarachnoid hemorrhage. 

Slightly increased caliber of lateral ventricles, consider sequela of mildly 
progressive atrophy and presumed posttreatment changes. 

No acute fracture of the calvarium. 

Left mastoid effusion. Right mastoid air cells clear. 



IMPRESSION:

1. No acute intracranial hemorrhage or fracture. 

2. Multiple intracranial enhancing lesions better demonstrated on prior brain 
MRI. 

3. Moderate low attenuation involving the white matter, likely slightly 
increased from 2017 study with mildly increased caliber of lateral 
ventricles. Consider post treatment changes and mildly progressive atrophy. 

4. Left mastoid effusion.



Dictated by Francisco Joya MD @ 2018 9:36:53 PM



Please note that all CT scans at this facility use dose modulation, iterative 
reconstruction, and/or weight-based dosing when appropriate to reduce radiation 
dose to as low as reasonably achievable.



Dictated by: Francisco Joya MD @ 2018 21:37:02

(Electronic Signature)



Report Signed by Proxy.
[facility]

## 2018-05-18 NOTE — CR
EXAM DATE: 18



PATIENT'S AGE: 63





Patient: FRANCESCA MARLEY



Facility: [place], [place]

Patient ID: [id number]

Site Patient ID: [id number].

Site Accession #: [id number]

: 1955

Study: XRay Extremity Right ankle [id number]/17/2018 8:29:37 PM

Ordering Physician: Doctor Temp



Final Report: 

INDICATION:

63-year-old female status post fall. 



TECHNIQUE:

Ankle radiograph 3 views 



COMPARISON:

None



FINDINGS:

Oblique, nondisplaced distal right fibular fracture at level of ankle joint. 
Possible avulsion fracture fragments at the inferior margin of medial malleolus 
with adjacent soft tissue swelling. Moderate degree of lateral right ankle soft 
tissue swelling. Ankle mortise appears to be congruent. No posterior malleoli 
as were tailored dome fracture. Visualized 5th metatarsal base intact on 
lateral view. 



IMPRESSION:

1. Oblique, displaced distal right fibular fracture. 

2. Medial malleolus avulsion fracture.



Dictated by Francisco Joya MD @ 2018 8:35:07 PM







Dictated by: Francisco Joya MD @ 2018 20:35:13

(Electronic Signature)



Report Signed by Proxy.
[facility]IVAS

## 2018-10-04 ENCOUNTER — HOSPITAL ENCOUNTER (INPATIENT)
Dept: HOSPITAL 56 - MW.ED | Age: 63
LOS: 5 days | Discharge: NURSING, REHAB OR LONG-TERM CARE | DRG: 947 | End: 2018-10-09
Attending: INTERNAL MEDICINE | Admitting: INTERNAL MEDICINE
Payer: MEDICARE

## 2018-10-04 DIAGNOSIS — A04.72: ICD-10-CM

## 2018-10-04 DIAGNOSIS — K21.9: ICD-10-CM

## 2018-10-04 DIAGNOSIS — E87.6: ICD-10-CM

## 2018-10-04 DIAGNOSIS — E03.9: ICD-10-CM

## 2018-10-04 DIAGNOSIS — R79.89: ICD-10-CM

## 2018-10-04 DIAGNOSIS — Z79.01: ICD-10-CM

## 2018-10-04 DIAGNOSIS — R53.1: Primary | ICD-10-CM

## 2018-10-04 DIAGNOSIS — E83.42: ICD-10-CM

## 2018-10-04 DIAGNOSIS — C79.00: ICD-10-CM

## 2018-10-04 DIAGNOSIS — C34.90: ICD-10-CM

## 2018-10-04 DIAGNOSIS — Z86.711: ICD-10-CM

## 2018-10-04 DIAGNOSIS — Z86.718: ICD-10-CM

## 2018-10-04 DIAGNOSIS — N18.3: ICD-10-CM

## 2018-10-04 DIAGNOSIS — D64.9: ICD-10-CM

## 2018-10-04 DIAGNOSIS — D69.6: ICD-10-CM

## 2018-10-04 DIAGNOSIS — Z79.899: ICD-10-CM

## 2018-10-04 DIAGNOSIS — C79.31: ICD-10-CM

## 2018-10-04 DIAGNOSIS — E43: ICD-10-CM

## 2018-10-04 DIAGNOSIS — I48.91: ICD-10-CM

## 2018-10-04 DIAGNOSIS — H54.7: ICD-10-CM

## 2018-10-04 DIAGNOSIS — D68.9: ICD-10-CM

## 2018-10-04 LAB
CHLORIDE SERPL-SCNC: 105 MMOL/L (ref 98–107)
SODIUM SERPL-SCNC: 141 MMOL/L (ref 136–145)

## 2018-10-04 PROCEDURE — 71045 X-RAY EXAM CHEST 1 VIEW: CPT

## 2018-10-04 PROCEDURE — 84484 ASSAY OF TROPONIN QUANT: CPT

## 2018-10-04 PROCEDURE — 94640 AIRWAY INHALATION TREATMENT: CPT

## 2018-10-04 PROCEDURE — 80053 COMPREHEN METABOLIC PANEL: CPT

## 2018-10-04 PROCEDURE — 96361 HYDRATE IV INFUSION ADD-ON: CPT

## 2018-10-04 PROCEDURE — 82962 GLUCOSE BLOOD TEST: CPT

## 2018-10-04 PROCEDURE — 85025 COMPLETE CBC W/AUTO DIFF WBC: CPT

## 2018-10-04 PROCEDURE — 36415 COLL VENOUS BLD VENIPUNCTURE: CPT

## 2018-10-04 PROCEDURE — 51701 INSERT BLADDER CATHETER: CPT

## 2018-10-04 PROCEDURE — 99285 EMERGENCY DEPT VISIT HI MDM: CPT

## 2018-10-04 PROCEDURE — 84443 ASSAY THYROID STIM HORMONE: CPT

## 2018-10-04 PROCEDURE — 85610 PROTHROMBIN TIME: CPT

## 2018-10-04 PROCEDURE — 96360 HYDRATION IV INFUSION INIT: CPT

## 2018-10-04 PROCEDURE — 85730 THROMBOPLASTIN TIME PARTIAL: CPT

## 2018-10-04 PROCEDURE — 93005 ELECTROCARDIOGRAM TRACING: CPT

## 2018-10-04 PROCEDURE — P9034 PLATELETS, PHERESIS: HCPCS

## 2018-10-04 PROCEDURE — 81001 URINALYSIS AUTO W/SCOPE: CPT

## 2018-10-04 PROCEDURE — 83735 ASSAY OF MAGNESIUM: CPT

## 2018-10-04 PROCEDURE — 70450 CT HEAD/BRAIN W/O DYE: CPT

## 2018-10-04 NOTE — CR
EXAMINATION: Portable chest radiograph.

 

HISTORY: Stroke code.

 

FINDINGS: 

The trachea is midline. The cardiomediastinal silhouette is within normal limits. No pulmonary infilt
rates, effusions or pneumothorax. Left perihilar scarring. Left-sided mary catheter is noted. Calcif
ied granuloma within the right apex.

 

Old right-sided rib fractures noted.

 

IMPRESSION: 

No acute cardiopulmonary process.

## 2018-10-04 NOTE — PCM.HP
H&P History of Present Illness





- General


Date of Service: 10/04/18


Admit Problem/Dx: 


 Admission Diagnosis/Problem





Admission Diagnosis/Problem      Weakness











- History of Present Illness


Initial Comments - Free Text/Narative: 





Marielena Subramanian is a 62 y/o female with history of small cell carcinoma of the lung 

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 

of weakness.  The patient has been getting chemotherapy and her last round of 

chemo was approximately 2 weeks ago.  Per family member, the patient has been 

bedridden for the past 2-3 years unable to ambulate on her own since brain mets 

has affected her lower extremities.  Patient is complaining of generalized 

abdominal pain.  States she had a BM approximately 2 days ago. No blood 

detected. She denies any headaches, change in vision, cough, dyspnea, chest 

pain. No dysuria or diarrhea. No nausea or vomiting.


  ** Bilateral Abdomen


Pain Score (Numeric/FACES): 10





- Related Data


Allergies/Adverse Reactions: 


 Allergies











Allergy/AdvReac Type Severity Reaction Status Date / Time


 


No Known Allergies Allergy   Verified 10/04/18 10:34











Home Medications: 


 Home Meds





Carvedilol 6.25 mg PO BID 01/09/17 [History]


Levothyroxine Sodium 100 mcg PO DAILY 07/06/17 [History]


Magnesium Oxide 800 mg PO TID 07/06/17 [History]


Omeprazole 20 mg PO ACBREAKFAST 07/06/17 [History]


Multivitamins [Childrens Chewable Vitamin] 0 mg PO DAILY 02/27/18 [History]


Warfarin [Coumadin] 7.5 mg PO TUTHSA@1800 02/27/18 [History]


Albuterol Sulfate [Proair Hfa] 1 - 2 puff INH ASDIRECTED 10/04/18 [History]


Warfarin [Coumadin] 5 mg PO SUMOWEFR@1800 10/04/18 [History]


predniSONE [Prednisone] 5 mg PO DAILY 10/04/18 [History]











Past Medical History


HEENT History: Reports: Impaired Vision


Other HEENT History: wears glasses sometimes


Cardiovascular History: Reports: Hypertension


Other Cardiovascular History: not currently taking medication


Respiratory History: Reports: Other (See Below)


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 

the brain


Gastrointestinal History: Reports: GERD


Genitourinary History: Reports: Other (See Below)


Other Genitourinary History: Renal failure


OB/GYN History: Reports: Pregnancy


Musculoskeletal History: Reports: Fracture


Other Musculoskeletal History: fx left ankle


Neurological History: Reports: Speech Problems, Vertigo, Other (See Below)


Other Neuro History: dizziness; Lung mets to the brain


Psychiatric History: Reports: None


Endocrine/Metabolic History: Reports: Hypothyroidism


Hematologic History: Reports: None


Other Hematologic History: On warfarin.


Immunologic History: Reports: None


Oncologic (Cancer) History: Reports: Brain, Lung


Other Oncologic History: Small Cell lung cancer with mets to brain


Dermatologic History: Reports: None





- Infectious Disease History


Infectious Disease History: Reports: Chicken Pox





- Past Surgical History


HEENT Surgical History: Reports: None


Cardiovascular Surgical History: Reports: Other (See Below)


Other Cardiovascular Surgeries/Procedures: portacath


Respiratory Surgical History: Reports: None


GI Surgical History: Reports: None


Female  Surgical History: Reports: None


Endocrine Surgical History: Reports: None


Neurological Surgical History: Reports: None


Musculoskeletal Surgical History: Reports: None


Oncologic Surgical History: Reports: None





Social & Family History





- Family History


Family Medical History: Noncontributory





- Tobacco Use


Smoking Status *Q: Former Smoker


Used Tobacco, but Quit: Yes


Month/Year Tobacco Last Used: 2015





- Caffeine Use


Caffeine Use: Reports: None





- Recreational Drug Use


Recreational Drug Use: No





- Living Situation & Occupation


Living situation: Reports: , with Family





H&P Review of Systems





- Review of Systems:


Review Of Systems: ROS reveals no pertinent complaints other than HPI.





Exam





- Exam


Exam: See Below





- Vital Signs


Vital Signs: 


 Last Vital Signs











Temp  36.7 C   10/04/18 14:00


 


Pulse  76   10/04/18 14:00


 


Resp  16   10/04/18 14:00


 


BP  111/64   10/04/18 14:00


 


Pulse Ox  98   10/04/18 14:00











Weight: 74.843 kg





- Exam


General: Alert, Oriented


HEENT: Conjunctiva Clear, Mucosa Moist & Pink, Posterior Pharynx Clear, Pupils 

Equal, Pupils Reactive


Lungs: Clear to Auscultation


Cardiovascular: Regular Rate, Regular Rhythm


GI/Abdominal Exam: Normal Bowel Sounds, Soft, Non-Tender, No Distention


Extremities: Non-Tender, No Pedal Edema


Skin: Warm, Dry


Neurological: Cranial Nerves Intact, Other (patient unable to move her right 

leg.)





- Patient Data


Lab Results Last 24 hrs: 


 Laboratory Results - last 24 hr











  10/04/18 10/04/18 10/04/18 Range/Units





  11:10 11:10 11:10 


 


WBC  0.17 L    (4.0-11.0)  K/uL


 


RBC  3.41 L    (4.30-5.90)  M/uL


 


Hgb  9.6 L    (12.0-16.0)  g/dL


 


Hct  30.3 L    (36.0-46.0)  %


 


MCV  88.9    (80.0-98.0)  fL


 


MCH  28.2    (27.0-32.0)  pg


 


MCHC  31.7    (31.0-37.0)  g/dL


 


RDW Std Deviation  67.1 H    (28.0-62.0)  fl


 


RDW Coeff of Castillo  21 H    (11.0-15.0)  %


 


Plt Count  97 L    (150-400)  K/uL


 


MPV  9.00    (7.40-12.00)  fL


 


Nucleated RBC %  0.0    /100WBC


 


Nucleated RBCs #  0    K/uL


 


INR   2.58   


 


APTT   83.7 H   (18.6-31.3)  SEC


 


Sodium    141  (136-145)  mmol/L


 


Potassium    4.3  (3.5-5.1)  mmol/L


 


Chloride    105  ()  mmol/L


 


Carbon Dioxide    26.9  (21.0-32.0)  mmol/L


 


BUN    35 H  (7.0-18.0)  mg/dL


 


Creatinine    1.1 H  (0.6-1.0)  mg/dL


 


Est Cr Clr Drug Dosing    44.03  mL/min


 


Estimated GFR (MDRD)    49.8  ml/min


 


Glucose    271 H  ()  mg/dL


 


Calcium    8.5  (8.5-10.1)  mg/dL


 


Total Bilirubin    0.8  (0.2-1.0)  mg/dL


 


AST    13 L  (15-37)  IU/L


 


ALT    29  (14-63)  IU/L


 


Alkaline Phosphatase    53  ()  U/L


 


Troponin I    0.060 H*  (0.000-0.056)  ng/mL


 


Total Protein    5.9 L  (6.4-8.2)  g/dL


 


Albumin    2.6 L  (3.4-5.0)  g/dL


 


Globulin    3.3  (2.0-3.5)  g/dL


 


Albumin/Globulin Ratio    0.8 L  (1.3-2.8)  


 


TSH 3rd Generation    16.48 H  (0.36-3.74)  uIU/mL


 


Urine Color     


 


Urine Appearance     


 


Urine pH     (5.0-8.0)  


 


Ur Specific Gravity     (1.001-1.035)  


 


Urine Protein     (NEGATIVE)  mg/dL


 


Urine Glucose (UA)     (NEGATIVE)  mg/dL


 


Urine Ketones     (NEGATIVE)  mg/dL


 


Urine Occult Blood     (NEGATIVE)  


 


Urine Nitrite     (NEGATIVE)  


 


Urine Bilirubin     (NEGATIVE)  


 


Urine Urobilinogen     (<2.0)  EU/dL


 


Ur Leukocyte Esterase     (NEGATIVE)  














  10/04/18 Range/Units





  16:50 


 


WBC   (4.0-11.0)  K/uL


 


RBC   (4.30-5.90)  M/uL


 


Hgb   (12.0-16.0)  g/dL


 


Hct   (36.0-46.0)  %


 


MCV   (80.0-98.0)  fL


 


MCH   (27.0-32.0)  pg


 


MCHC   (31.0-37.0)  g/dL


 


RDW Std Deviation   (28.0-62.0)  fl


 


RDW Coeff of Castillo   (11.0-15.0)  %


 


Plt Count   (150-400)  K/uL


 


MPV   (7.40-12.00)  fL


 


Nucleated RBC %   /100WBC


 


Nucleated RBCs #   K/uL


 


INR   


 


APTT   (18.6-31.3)  SEC


 


Sodium   (136-145)  mmol/L


 


Potassium   (3.5-5.1)  mmol/L


 


Chloride   ()  mmol/L


 


Carbon Dioxide   (21.0-32.0)  mmol/L


 


BUN   (7.0-18.0)  mg/dL


 


Creatinine   (0.6-1.0)  mg/dL


 


Est Cr Clr Drug Dosing   mL/min


 


Estimated GFR (MDRD)   ml/min


 


Glucose   ()  mg/dL


 


Calcium   (8.5-10.1)  mg/dL


 


Total Bilirubin   (0.2-1.0)  mg/dL


 


AST   (15-37)  IU/L


 


ALT   (14-63)  IU/L


 


Alkaline Phosphatase   ()  U/L


 


Troponin I   (0.000-0.056)  ng/mL


 


Total Protein   (6.4-8.2)  g/dL


 


Albumin   (3.4-5.0)  g/dL


 


Globulin   (2.0-3.5)  g/dL


 


Albumin/Globulin Ratio   (1.3-2.8)  


 


TSH 3rd Generation   (0.36-3.74)  uIU/mL


 


Urine Color  YELLOW  


 


Urine Appearance  CLEAR  


 


Urine pH  7.5  (5.0-8.0)  


 


Ur Specific Gravity  1.020  (1.001-1.035)  


 


Urine Protein  30  (NEGATIVE)  mg/dL


 


Urine Glucose (UA)  100 H  (NEGATIVE)  mg/dL


 


Urine Ketones  NEGATIVE  (NEGATIVE)  mg/dL


 


Urine Occult Blood  NEGATIVE  (NEGATIVE)  


 


Urine Nitrite  NEGATIVE  (NEGATIVE)  


 


Urine Bilirubin  NEGATIVE  (NEGATIVE)  


 


Urine Urobilinogen  1.0  (<2.0)  EU/dL


 


Ur Leukocyte Esterase  NEGATIVE  (NEGATIVE)  











Result Diagrams: 


 10/04/18 11:10





 10/04/18 11:10


Problem List Initiated/Reviewed/Updated: Yes


Orders Last 24hrs: 


 Active Orders 24 hr











 Category Date Time Status


 


 Admission Status [Patient Status] [ADT] Stat ADT  10/04/18 12:25 Active


 


 Patient Status [ADT] Routine ADT  10/04/18 13:45 Active


 


 Bedrest Bedside Commode [RC] ASDIRECTED Care  10/04/18 13:45 Active


 


 Oxygen Therapy [RC] PRN Care  10/04/18 13:45 Active


 


 RT Aerosol Therapy [RC] ASDIRECTED Care  10/04/18 11:43 Active


 


 Telemetry Monitoring [Cardiac Monitoring] [RC] .AS Care  10/04/18 13:05 Active





 DIRECTED   


 


 VTE/DVT Education [RC] PER UNIT ROUTINE Care  10/04/18 13:45 Active


 


 Vital Signs [RC] Q4H Care  10/04/18 13:45 Active


 


 Neutropenic [DIET] Diet  10/04/18 Dinner Active


 


 INR,PT,PROTHROMBIN TIME [COAG] AM Lab  10/05/18 05:11 Ordered


 


 INR,PT,PROTHROMBIN TIME [COAG] AM Lab  10/06/18 05:11 Ordered


 


 INR,PT,PROTHROMBIN TIME [COAG] AM Lab  10/07/18 05:11 Ordered


 


 TROPONIN I [CHEM] Q8H Lab  10/04/18 18:00 Ordered


 


 TROPONIN I [CHEM] Q8H Lab  10/05/18 02:00 Ordered


 


 UA W/MICROSCOPIC [URIN] Routine Lab  10/04/18 16:50 Results


 


 Acetaminophen [Tylenol] Med  10/04/18 13:45 Active





 650 mg PO Q4H PRN   


 


 Carvedilol [Coreg] Med  10/04/18 21:00 Active





 6.25 mg PO BID   


 


 Docusate Sodium [Colace] Med  10/04/18 13:45 Active





 100 mg PO BID PRN   


 


 Ibuprofen [Motrin] Med  10/04/18 13:45 Active





 600 mg PO Q6H PRN   


 


 Levothyroxine [Synthroid] Med  10/05/18 07:30 Active





 100 mcg PO ACBREAKFAST   


 


 Omeprazole Med  10/05/18 07:30 Active





 20 mg PO ACBREAKFAST   


 


 Ondansetron [Zofran ODT] Med  10/04/18 13:45 Active





 4 mg PO Q4H PRN   


 


 Ondansetron [Zofran] Med  10/04/18 13:45 Active





 4 mg IVPUSH Q4H PRN   


 


 Polyethylene Glycol 3350 [MiraLAX] Med  10/04/18 13:45 Active





 17 gm PO DAILY PRN   


 


 Sodium Chloride 0.9% [Normal Saline] 1,000 ml Med  10/04/18 13:45 Active





 IV ASDIRECTED   


 


 Temazepam [Restoril] Med  10/04/18 13:45 Active





 15 mg PO BEDTIME PRN   


 


 Warfarin [Coumadin] Med  10/05/18 14:00 Active





 5 mg PO SuMoWeFr@1400   


 


 Warfarin [Coumadin] Med  10/04/18 14:00 Active





 7.5 mg PO TuThSa@1400   


 


 oxyCODONE Med  10/04/18 13:45 Active





 5 mg PO Q4H PRN   


 


 predniSONE Med  10/05/18 09:00 Active





 5 mg PO DAILY   


 


 Sequential Compression Device [OM.PC] Per Unit Routine Oth  10/04/18 13:48 

Ordered


 


 Resuscitation Status Routine Resus Stat  10/04/18 13:45 Ordered








 Medication Orders





Acetaminophen (Tylenol)  650 mg PO Q4H PRN


   PRN Reason: Pain (Mild 1-3)/fever


Carvedilol (Coreg)  6.25 mg PO BID ZULY


Docusate Sodium (Colace)  100 mg PO BID PRN


   PRN Reason: Constipation


Sodium Chloride (Normal Saline)  1,000 mls @ 100 mls/hr IV ASDIRECTED ZULY


   Last Admin: 10/04/18 16:26  Dose: 100 mls/hr


Ibuprofen (Motrin)  600 mg PO Q6H PRN


   PRN Reason: Pain (mild 1-3)


Levothyroxine Sodium (Synthroid)  100 mcg PO ACBREAKFAST ZULY


Omeprazole (Omeprazole)  20 mg PO [facility]BREAKFAST [facility]


Ondansetron HCl (Zofran Odt)  4 mg PO Q4H PRN


   PRN Reason: nausea, able to take PO


Ondansetron HCl (Zofran)  4 mg IVPUSH Q4H PRN


   PRN Reason: Nausea


Oxycodone HCl (Oxycodone)  5 mg PO Q4H PRN


   PRN Reason: Pain (moderate 4-6)


Polyethylene Glycol (Miralax)  17 gm PO DAILY PRN


   PRN Reason: Constipation


Prednisone (Prednisone)  5 mg PO DAILY [facility]


Temazepam (Restoril)  15 mg PO BEDTIME PRN


   PRN Reason: Sleep


Warfarin Sodium (Coumadin)  7.5 mg PO TuThSa@1400 [facility]


   Last Admin: 10/04/18 15:00  Dose: 7.5 mg


Warfarin Sodium (Coumadin)  5 mg PO SuMoWeFr@1400 [facility]








Assessment/Plan Comment:: 





Assessment:


1. Generalized weakness


2. Elevated troponin


3. Hypothyroidism


4. Hyperglycemia


5. Chronic kidney disease, stage III


6. Past medical history of small cell carcinoma of the lung with metastases to 

brain, pulmonary embolism on warfarin





Plan:


1. Admit as observation to the medical floor. 


2. Vitals, I/O's per floor routine


3. Activity: bedrest


4. Diet: regular diet


5. Code status: FULL CODE





1. Generalized weakness- multifactorial, likely secondary to her cancer and 

chemotherapy.  Will hydrate and monitor. Order PT/OT.


2. Pancytopenia- 2/2 chemotherapy. Patient is severely neutropenic. So i've 

ordered neutropenic precautions and diet.


3. Elevated troponin. Initial troponin of 0.06. Will continue to trend serial 

troponins.


4. Hyperglycemia- Will order HgA1c and monitor her blood glucose. Ordered ISS 

for now.


5. Chronic kidney disease,stage 3. I will continue with maintenance fluids for 

now and monitor her kidney function.


6. Hypothyroidism. TSH of 16. I suspect that this may be reactive. Will 

continue current home dose of synthroid. Patient will need to check her TSH as 

outpatient after discharge.


7. PMH pumonary embolism- will continue her home dose of warfarin and check 

daily INR.





Dispo: 1-2 days.

## 2018-10-04 NOTE — EDM.PDOC
ED HPI GENERAL MEDICAL PROBLEM





- General


Stated Complaint: WEAKNESS


Time Seen by Provider: 10/04/18 10:29


Source of Information: Reports: Patient


History Limitations: Reports: No Limitations





- History of Present Illness


INITIAL COMMENTS - FREE TEXT/NARRATIVE: 


HISTORY AND PHYSICAL:


Stroke code was called upon patient arrival at





History of present illness:


Patient is a 63-year-old female who presents to the emergency room with 

complaints of generalized weakness and abdominal pain. The significant other 

states that she recently was diagnosed with a lesion to her right kidney, 

metastasis from her lung cancer. States since that time she has had some 

intermittent abdominal pain, nausea and generalized weakness. Denies any fever, 

chills, chest pain, shortness of breath or cough. Denies any vomiting, diarrhea

, constipation, or dysuria.





Patient has a history of small cell lung cancer (Stage 4) with metastasis to 

the brain and recently the kidney. She does receive routine chemotherapy, last 

treatment 2 weeks ago. Has a history of anemia (secondary to chemotherapy), 

pneumonia, PE, DVT, renal failure, and hypothyroidism.





Review of systems: 


As per history of present illness and below otherwise all systems reviewed and 

negative.





Past medical history: 


As per history of present illness and as reviewed below otherwise 

noncontributory.





Surgical history: 


As per history of present illness and as reviewed below otherwise 

noncontributory.





Social history: 


No reported history of drug or alcohol abuse.





Family history: 


As per history of present illness and as reviewed below otherwise 

noncontributory.





Physical exam:


General: Well-developed and well-nourished 63-year-old female. Alert and 

disorientated to date. Appears flat but in no acute distress.


HEENT: Atraumatic, normocephalic, pupils equal and reactive bilaterally, 

negative for conjunctival pallor or scleral icterus, mucous membranes moist, 

throat clear, neck supple, nontender, trachea midline. No drooling or trismus 

noted. No meningeal signs


Lungs: Diminished throughout, breath sounds equal bilaterally, chest nontender. 

Dry cough noted.


Heart: S1S2, regular rate and rhythm without overt murmur


Abdomen: Soft, nondistended, nontender. Negative for masses or 

hepatosplenomegaly. Negative for costovertebral tenderness.


Pelvis: Stable nontender.


Genitourinary: Deferred.


Rectal: Deferred.


Skin: Intact, warm, dry. No lesions or rashes noted.


Extremities: Atraumatic, moves all per self, negative for cords or calf pain. 

Neurovascular unremarkable.


Neuro: Awake, alert, oriented. Cranial nerves II through XII unremarkable. 

Cerebellum unremarkable. Motor and sensory unremarkable throughout. Exam 

nonfocal.





Notes:


5/17/2018: Patient was seen at our facility for a right ankle bimalleolus 

fracture and elevated troponin.  Head CT shows no acute intracranial hemorrhage 

or fracture. There are multiple intracranial lesions noted. She was ultimately 

transferred to [facility] in [place].  reports that she was told that the 

troponin was elevated due to her chemo treatment; did not have MI.


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 

adrenal mass, consistent with metastasis.





NIH: 5 (right sided weakness). Today's bedside glucose is 203. Patient 

chronically has been having generalized weakness.  states "It goes back 

and forth from her left to her right". But states overall "shes just going down 

hill fast". Today she does have some mild right sided weakness. Sounds like 

this is acute on chronic weakness. 





1054: Dr Blankenship called with head CT findings: no acute findings. No acute 

findings with the chest xray. EKG shows NSR with rate of 90, no acute findings.


Labs show low Hgb 9.6 (L), Platelets 97 (L), Troponin 0.06 (H), BUN 35 (H), 

Creat 1.1 (H) and TSH 16.48 (H).  Patient was made aware of her abnormal lab 

values. She denies any chest pain, shortness of breath, diaphoresis or any 

supportive symptoms of cardiac-related issues. I did offer her for transfer to 

[facility] to due to the elevated troponin, patient and family members declined. 

They're requesting to stay at our facility. They're aware that we do not have 

cardiology on staff. Dr. Morgan was consulted on this case and agreeable to 

keeping this patient for observation with telemetry.





Diagnostics:


CBC, CMP, Troponin, EKG, PT/INR, Chest X-ray, Head CT without contrast, bedside 

glucose, NIH/Neuro Check





Therapeutics:


Saline lock





Impression: 


Weakness


Elevated Troponin


Hypothyroidism


Anemia





Plan:


Observation admission to Med/Surg with Telemetry





Definitive disposition and diagnosis as appropriate pending reevaluation and 

review of above.








- Related Data


 Allergies











Allergy/AdvReac Type Severity Reaction Status Date / Time


 


No Known Allergies Allergy   Verified 10/04/18 10:34











Home Meds: 


 Home Meds





Carvedilol 6.25 mg PO BID 01/09/17 [History]


Levothyroxine Sodium 100 mcg PO DAILY 07/06/17 [History]


Magnesium Oxide 800 mg PO TID 07/06/17 [History]


Omeprazole 20 mg PO ACBREAKFAST 07/06/17 [History]


Multivitamins [Childrens Chewable Vitamin] 0 mg PO DAILY 02/27/18 [History]


Warfarin [Coumadin] 7.5 mg PO ASDIRECTED 02/27/18 [History]


Albuterol Sulfate [Proair Hfa] 1 - 2 puff INH ASDIRECTED 10/04/18 [History]


predniSONE [Prednisone] 5 mg PO DAILY 10/04/18 [History]











Past Medical History


HEENT History: Reports: Impaired Vision


Other HEENT History: wears glasses sometimes


Cardiovascular History: Reports: Hypertension


Other Cardiovascular History: not currently taking medication


Respiratory History: Reports: Other (See Below)


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 

the brain


Gastrointestinal History: Reports: GERD


Genitourinary History: Reports: Other (See Below)


Other Genitourinary History: Renal failure


OB/GYN History: Reports: Pregnancy


Musculoskeletal History: Reports: Fracture


Other Musculoskeletal History: fx left ankle


Neurological History: Reports: Vertigo, Other (See Below)


Other Neuro History: dizziness; Lung mets to the brain


Psychiatric History: Reports: None


Endocrine/Metabolic History: Reports: Hypothyroidism


Hematologic History: Reports: None


Other Hematologic History: son states her blood has been slow to clot recently


Immunologic History: Reports: None


Oncologic (Cancer) History: Reports: Lung


Other Oncologic History: lung cancer is metastisized to brain


Dermatologic History: Reports: None





- Infectious Disease History


Infectious Disease History: Reports: Chicken Pox





- Past Surgical History


HEENT Surgical History: Reports: None


Cardiovascular Surgical History: Reports: Other (See Below)


Other Cardiovascular Surgeries/Procedures: portacath


Respiratory Surgical History: Reports: None


Female  Surgical History: Reports: None


Musculoskeletal Surgical History: Reports: None


Oncologic Surgical History: Reports: Other (See Below)





Social & Family History





- Family History


Family Medical History: Noncontributory





- Caffeine Use


Caffeine Use: Reports: Soda





- Living Situation & Occupation


Living situation: Reports: , with Family





ED ROS GENERAL





- Review of Systems


Review Of Systems: ROS reveals no pertinent complaints other than HPI.





ED EXAM, GENERAL





- Physical Exam


Exam: See Below (See dictation)





Course





- Vital Signs


Last Recorded V/S: 


 Last Vital Signs











Temp  98.1 F   10/04/18 10:35


 


Pulse  70   10/04/18 11:35


 


Resp  16   10/04/18 11:35


 


BP  94/55 L  10/04/18 11:35


 


Pulse Ox  90 L  10/04/18 11:35














- Orders/Labs/Meds


Orders: 


 Active Orders 24 hr











 Category Date Time Status


 


 Admission Status [Patient Status] [ADT] Stat ADT  10/04/18 12:25 Ordered


 


 Assess Neurological Status [RC] ASDIRECTED Care  10/04/18 10:35 Active


 


 Blood Glucose Check, Bedside [RC] STAT Care  10/04/18 10:35 Active


 


 Cardiac Monitoring [RC] .AS DIRECTED Care  10/04/18 10:35 Active


 


 EKG Documentation Completion [RC] STAT Care  10/04/18 10:35 Active


 


 Height and Weight [RC] UPON Care  10/04/18 10:35 Active


 


 Initiate Acute Stroke Protocol [RC] STAT Care  10/04/18 10:35 Active


 


 NIH Stroke Scale [RC] ASDIRECTED Care  10/04/18 10:35 Active


 


 Oxygen Therapy [RC] ASDIRECTED Care  10/04/18 10:35 Active


 


 RT Aerosol Therapy [RC] ASDIRECTED Care  10/04/18 11:43 Active


 


 UA W/MICROSCOPIC [URIN] Stat Lab  10/04/18 11:07 Ordered











Labs: 


 Laboratory Tests











  10/04/18 10/04/18 10/04/18 Range/Units





  11:10 11:10 11:10 


 


WBC  0.17 L    (4.0-11.0)  K/uL


 


RBC  3.41 L    (4.30-5.90)  M/uL


 


Hgb  9.6 L    (12.0-16.0)  g/dL


 


Hct  30.3 L    (36.0-46.0)  %


 


MCV  88.9    (80.0-98.0)  fL


 


MCH  28.2    (27.0-32.0)  pg


 


MCHC  31.7    (31.0-37.0)  g/dL


 


RDW Std Deviation  67.1 H    (28.0-62.0)  fl


 


RDW Coeff of Castillo  21 H    (11.0-15.0)  %


 


Plt Count  97 L    (150-400)  K/uL


 


MPV  9.00    (7.40-12.00)  fL


 


Nucleated RBC %  0.0    /100WBC


 


Nucleated RBCs #  0    K/uL


 


INR   2.58   


 


APTT   83.7 H   (18.6-31.3)  SEC


 


Sodium    141  (136-145)  mmol/L


 


Potassium    4.3  (3.5-5.1)  mmol/L


 


Chloride    105  ()  mmol/L


 


Carbon Dioxide    26.9  (21.0-32.0)  mmol/L


 


BUN    35 H  (7.0-18.0)  mg/dL


 


Creatinine    1.1 H  (0.6-1.0)  mg/dL


 


Est Cr Clr Drug Dosing    44.03  mL/min


 


Estimated GFR (MDRD)    49.8  ml/min


 


Glucose    271 H  ()  mg/dL


 


Calcium    8.5  (8.5-10.1)  mg/dL


 


Total Bilirubin    0.8  (0.2-1.0)  mg/dL


 


AST    13 L  (15-37)  IU/L


 


ALT    29  (14-63)  IU/L


 


Alkaline Phosphatase    53  ()  U/L


 


Troponin I    0.060 H*  (0.000-0.056)  ng/mL


 


Total Protein    5.9 L  (6.4-8.2)  g/dL


 


Albumin    2.6 L  (3.4-5.0)  g/dL


 


Globulin    3.3  (2.0-3.5)  g/dL


 


Albumin/Globulin Ratio    0.8 L  (1.3-2.8)  


 


TSH 3rd Generation    16.48 H  (0.36-3.74)  uIU/mL











Meds: 


Medications














Discontinued Medications














Generic Name Dose Route Start Last Admin





  Trade Name Freq  PRN Reason Stop Dose Admin


 


Albuterol/Ipratropium  3 ml  10/04/18 11:43  10/04/18 11:54





  Duoneb 3.0-0.5 Mg/3 Ml  NEB  10/04/18 11:44  3 ml





  ONETIME ONE   Administration





     





     





     





     


 


Sodium Chloride  500 mls @ 999 mls/hr  10/04/18 11:13  10/04/18 11:15





  Normal Saline  IV  10/04/18 11:43  999 mls/hr





  .BOLUS ONE   Administration





     





     





     





     














Departure





- Departure


Time of Disposition: 12:32


Disposition: Refer to Observation


Clinical Impression: 


 Weakness, Elevated troponin





Hypothyroid


Qualifiers:


 Hypothyroidism type: unspecified Qualified Code(s): E03.9 - Hypothyroidism, 

unspecified





Anemia


Qualifiers:


 Anemia type: unspecified type Qualified Code(s): D64.9 - Anemia, unspecified








- Discharge Information


Referrals: 


Lg Centeno MD [Primary Care Provider] - 





- My Orders


Last 24 Hours: 


My Active Orders





10/04/18 10:35


Assess Neurological Status [RC] ASDIRECTED 


Blood Glucose Check, Bedside [RC] STAT 


Cardiac Monitoring [RC] .AS DIRECTED 


EKG Documentation Completion [RC] STAT 


Height and Weight [RC] UPON 


Initiate Acute Stroke Protocol [RC] STAT 


NIH Stroke Scale [RC] ASDIRECTED 


Oxygen Therapy [RC] ASDIRECTED 





10/04/18 11:07


UA W/MICROSCOPIC [URIN] Stat 





10/04/18 11:43


RT Aerosol Therapy [RC] ASDIRECTED 





10/04/18 12:25


Admission Status [Patient Status] [ADT] Stat 














- Assessment/Plan


Last 24 Hours: 


My Active Orders





10/04/18 10:35


Assess Neurological Status [RC] ASDIRECTED 


Blood Glucose Check, Bedside [RC] STAT 


Cardiac Monitoring [RC] .AS DIRECTED 


EKG Documentation Completion [RC] STAT 


Height and Weight [RC] UPON 


Initiate Acute Stroke Protocol [RC] STAT 


NIH Stroke Scale [RC] ASDIRECTED 


Oxygen Therapy [RC] ASDIRECTED 





10/04/18 11:07


UA W/MICROSCOPIC [URIN] Stat 





10/04/18 11:43


RT Aerosol Therapy [RC] ASDIRECTED 





10/04/18 12:25


Admission Status [Patient Status] [ADT] Stat

## 2018-10-04 NOTE — CT
EXAMINATION:  Non contrast CT head. Coronal and sagittal reformats.

 

HISTORY: Stroke code

 

FINDINGS:  

No evidence of intra or extra axial hemorrhage, mass,  midline shift, hydrocephalus or edema.  No hyp
oattenuation changes in the major vascular territories to suggest acute infarct.  There are multiple 
areas of focal encephalomalacia noted, not significantly changed. Prominent confluent periventricular
 and subcortical white matter hypodensities with generalized atrophy.

 

Stable small parenchymal calcifications noted bilaterally. No evidence of substantial vascular calcif
ications.

 

Desiccation of the left mastoid air cells again noted. Paranasal sinuses and middle ears are otherwis
e clear.

 

Pituitary fossa appears unremarkable. Orbits and globes are symmetric.

 

Calvarium is intact. No evidence of skull fracture. 

 

IMPRESSION: 

 

1. No acute intracranial findings.

2. Moderate white matter hypodensities, likely small vessel ischemic changes versus radiation.

3. Stable small focal areas of encephalomalacia bilaterally.

4. Mild generalized atrophy.

5. Stable opacification of left mastoid air cells.

 

Findings were called to the ER at 10:55 AM.

## 2018-10-05 LAB
CHLORIDE SERPL-SCNC: 107 MMOL/L (ref 98–107)
SODIUM SERPL-SCNC: 139 MMOL/L (ref 136–145)

## 2018-10-05 RX ADMIN — OMEPRAZOLE SCH MG: 20 CAPSULE, DELAYED RELEASE ORAL at 07:03

## 2018-10-05 NOTE — PCM.PN
- General Info


Date of Service: 10/05/18


Subjective Update: 


The patient did well overnight. Afebrile. States she feels somewhat better. 

denies any chest pain, shortness of breath. No cough. No dysuria or diarrhea.





- Patient Data


Vitals - Most Recent: 


 Last Vital Signs











Temp  37.4 C   10/05/18 15:32


 


Pulse  84   10/05/18 15:32


 


Resp  20   10/05/18 15:32


 


BP  105/62   10/05/18 15:32


 


Pulse Ox  94 L  10/05/18 15:32











Weight - Most Recent: 74.843 kg


I&O - Last 24 Hours: 


 Intake & Output











 10/05/18 10/05/18 10/05/18





 06:59 14:59 22:59


 


Intake Total 1380  1908


 


Output Total 0  0


 


Balance 1380  1908











Lab Results Last 24 Hours: 


 Laboratory Results - last 24 hr











  10/04/18 10/04/18 10/05/18 Range/Units





  16:50 18:03 01:50 


 


WBC     (4.0-11.0)  K/uL


 


RBC     (4.30-5.90)  M/uL


 


Hgb     (12.0-16.0)  g/dL


 


Hct     (36.0-46.0)  %


 


MCV     (80.0-98.0)  fL


 


MCH     (27.0-32.0)  pg


 


MCHC     (31.0-37.0)  g/dL


 


RDW Std Deviation     (28.0-62.0)  fl


 


RDW Coeff of Castillo     (11.0-15.0)  %


 


Plt Count     (150-400)  K/uL


 


MPV     (7.40-12.00)  fL


 


Neut % (Auto)     (48.0-80.0)  %


 


Lymph % (Auto)     (16.0-40.0)  %


 


Mono % (Auto)     (0.0-15.0)  %


 


Eos % (Auto)     (0.0-7.0)  %


 


Baso % (Auto)     (0.0-1.5)  %


 


Neut # (Auto)     (1.4-5.7)  K/uL


 


Lymph # (Auto)     (0.6-2.4)  K/uL


 


Mono # (Auto)     (0.0-0.8)  K/uL


 


Eos # (Auto)     (0.0-0.7)  K/uL


 


Baso # (Auto)     (0.0-0.1)  K/uL


 


Nucleated RBC %     /100WBC


 


Nucleated RBCs #     K/uL


 


INR     


 


Sodium     (136-145)  mmol/L


 


Potassium     (3.5-5.1)  mmol/L


 


Chloride     ()  mmol/L


 


Carbon Dioxide     (21.0-32.0)  mmol/L


 


BUN     (7.0-18.0)  mg/dL


 


Creatinine     (0.6-1.0)  mg/dL


 


Est Cr Clr Drug Dosing     mL/min


 


Estimated GFR (MDRD)     ml/min


 


Glucose     ()  mg/dL


 


POC Glucose     ()  mg/dL


 


Calcium     (8.5-10.1)  mg/dL


 


Magnesium     (1.8-2.4)  mg/dL


 


Total Bilirubin     (0.2-1.0)  mg/dL


 


AST     (15-37)  IU/L


 


ALT     (14-63)  IU/L


 


Alkaline Phosphatase     ()  U/L


 


Troponin I   < 0.050  < 0.050  (0.000-0.056)  ng/mL


 


Total Protein     (6.4-8.2)  g/dL


 


Albumin     (3.4-5.0)  g/dL


 


Globulin     (2.0-3.5)  g/dL


 


Albumin/Globulin Ratio     (1.3-2.8)  


 


Urine Color  YELLOW    


 


Urine Appearance  CLEAR    


 


Urine pH  7.5    (5.0-8.0)  


 


Ur Specific Gravity  1.020    (1.001-1.035)  


 


Urine Protein  30    (NEGATIVE)  mg/dL


 


Urine Glucose (UA)  100 H    (NEGATIVE)  mg/dL


 


Urine Ketones  NEGATIVE    (NEGATIVE)  mg/dL


 


Urine Occult Blood  NEGATIVE    (NEGATIVE)  


 


Urine Nitrite  NEGATIVE    (NEGATIVE)  


 


Urine Bilirubin  NEGATIVE    (NEGATIVE)  


 


Urine Urobilinogen  1.0    (<2.0)  EU/dL


 


Ur Leukocyte Esterase  NEGATIVE    (NEGATIVE)  


 


Urine RBC  0-1    (0-2/HPF)  


 


Urine WBC  0-1    (0-5/HPF)  


 


Ur Epithelial Cells  OCCASIONAL    (NONE-FEW)  


 


Amorphous Sediment  LIGHT    (NEGATIVE)  


 


Urine Bacteria  FEW    (NEGATIVE)  


 


Urine Mucus  LIGHT    (NONE-MOD)  














  10/05/18 10/05/18 10/05/18 Range/Units





  04:40 04:40 04:40 


 


WBC   0.24 L   (4.0-11.0)  K/uL


 


RBC   2.75 L   (4.30-5.90)  M/uL


 


Hgb   7.7 L   (12.0-16.0)  g/dL


 


Hct   24.3 L   (36.0-46.0)  %


 


MCV   88.4   (80.0-98.0)  fL


 


MCH   28.0   (27.0-32.0)  pg


 


MCHC   31.7   (31.0-37.0)  g/dL


 


RDW Std Deviation   66.6 H   (28.0-62.0)  fl


 


RDW Coeff of Castillo   21 H   (11.0-15.0)  %


 


Plt Count   73 L   (150-400)  K/uL


 


MPV   9.00   (7.40-12.00)  fL


 


Neut % (Auto)   4.1 L   (48.0-80.0)  %


 


Lymph % (Auto)   91.7 H   (16.0-40.0)  %


 


Mono % (Auto)   0.0   (0.0-15.0)  %


 


Eos % (Auto)   4.2   (0.0-7.0)  %


 


Baso % (Auto)   0.0   (0.0-1.5)  %


 


Neut # (Auto)   0.0 L   (1.4-5.7)  K/uL


 


Lymph # (Auto)   0.2 L   (0.6-2.4)  K/uL


 


Mono # (Auto)   0.0   (0.0-0.8)  K/uL


 


Eos # (Auto)   0.0   (0.0-0.7)  K/uL


 


Baso # (Auto)   0.0   (0.0-0.1)  K/uL


 


Nucleated RBC %   0.0   /100WBC


 


Nucleated RBCs #   0   K/uL


 


INR  2.41    


 


Sodium    139  (136-145)  mmol/L


 


Potassium    3.5  (3.5-5.1)  mmol/L


 


Chloride    107  ()  mmol/L


 


Carbon Dioxide    24.4  (21.0-32.0)  mmol/L


 


BUN    25 H  (7.0-18.0)  mg/dL


 


Creatinine    0.7  (0.6-1.0)  mg/dL


 


Est Cr Clr Drug Dosing    69.19  mL/min


 


Estimated GFR (MDRD)    > 60.0  ml/min


 


Glucose    117 H  ()  mg/dL


 


POC Glucose     ()  mg/dL


 


Calcium    7.6 L  (8.5-10.1)  mg/dL


 


Magnesium    1.7 L  (1.8-2.4)  mg/dL


 


Total Bilirubin    0.6  (0.2-1.0)  mg/dL


 


AST    6 L  (15-37)  IU/L


 


ALT    25  (14-63)  IU/L


 


Alkaline Phosphatase    53  ()  U/L


 


Troponin I     (0.000-0.056)  ng/mL


 


Total Protein    5.1 L  (6.4-8.2)  g/dL


 


Albumin    2.1 L  (3.4-5.0)  g/dL


 


Globulin    3.0  (2.0-3.5)  g/dL


 


Albumin/Globulin Ratio    0.7 L  (1.3-2.8)  


 


Urine Color     


 


Urine Appearance     


 


Urine pH     (5.0-8.0)  


 


Ur Specific Gravity     (1.001-1.035)  


 


Urine Protein     (NEGATIVE)  mg/dL


 


Urine Glucose (UA)     (NEGATIVE)  mg/dL


 


Urine Ketones     (NEGATIVE)  mg/dL


 


Urine Occult Blood     (NEGATIVE)  


 


Urine Nitrite     (NEGATIVE)  


 


Urine Bilirubin     (NEGATIVE)  


 


Urine Urobilinogen     (<2.0)  EU/dL


 


Ur Leukocyte Esterase     (NEGATIVE)  


 


Urine RBC     (0-2/HPF)  


 


Urine WBC     (0-5/HPF)  


 


Ur Epithelial Cells     (NONE-FEW)  


 


Amorphous Sediment     (NEGATIVE)  


 


Urine Bacteria     (NEGATIVE)  


 


Urine Mucus     (NONE-MOD)  














  10/05/18 Range/Units





  06:31 


 


WBC   (4.0-11.0)  K/uL


 


RBC   (4.30-5.90)  M/uL


 


Hgb   (12.0-16.0)  g/dL


 


Hct   (36.0-46.0)  %


 


MCV   (80.0-98.0)  fL


 


MCH   (27.0-32.0)  pg


 


MCHC   (31.0-37.0)  g/dL


 


RDW Std Deviation   (28.0-62.0)  fl


 


RDW Coeff of Castillo   (11.0-15.0)  %


 


Plt Count   (150-400)  K/uL


 


MPV   (7.40-12.00)  fL


 


Neut % (Auto)   (48.0-80.0)  %


 


Lymph % (Auto)   (16.0-40.0)  %


 


Mono % (Auto)   (0.0-15.0)  %


 


Eos % (Auto)   (0.0-7.0)  %


 


Baso % (Auto)   (0.0-1.5)  %


 


Neut # (Auto)   (1.4-5.7)  K/uL


 


Lymph # (Auto)   (0.6-2.4)  K/uL


 


Mono # (Auto)   (0.0-0.8)  K/uL


 


Eos # (Auto)   (0.0-0.7)  K/uL


 


Baso # (Auto)   (0.0-0.1)  K/uL


 


Nucleated RBC %   /100WBC


 


Nucleated RBCs #   K/uL


 


INR   


 


Sodium   (136-145)  mmol/L


 


Potassium   (3.5-5.1)  mmol/L


 


Chloride   ()  mmol/L


 


Carbon Dioxide   (21.0-32.0)  mmol/L


 


BUN   (7.0-18.0)  mg/dL


 


Creatinine   (0.6-1.0)  mg/dL


 


Est Cr Clr Drug Dosing   mL/min


 


Estimated GFR (MDRD)   ml/min


 


Glucose   ()  mg/dL


 


POC Glucose  112 H  ()  mg/dL


 


Calcium   (8.5-10.1)  mg/dL


 


Magnesium   (1.8-2.4)  mg/dL


 


Total Bilirubin   (0.2-1.0)  mg/dL


 


AST   (15-37)  IU/L


 


ALT   (14-63)  IU/L


 


Alkaline Phosphatase   ()  U/L


 


Troponin I   (0.000-0.056)  ng/mL


 


Total Protein   (6.4-8.2)  g/dL


 


Albumin   (3.4-5.0)  g/dL


 


Globulin   (2.0-3.5)  g/dL


 


Albumin/Globulin Ratio   (1.3-2.8)  


 


Urine Color   


 


Urine Appearance   


 


Urine pH   (5.0-8.0)  


 


Ur Specific Gravity   (1.001-1.035)  


 


Urine Protein   (NEGATIVE)  mg/dL


 


Urine Glucose (UA)   (NEGATIVE)  mg/dL


 


Urine Ketones   (NEGATIVE)  mg/dL


 


Urine Occult Blood   (NEGATIVE)  


 


Urine Nitrite   (NEGATIVE)  


 


Urine Bilirubin   (NEGATIVE)  


 


Urine Urobilinogen   (<2.0)  EU/dL


 


Ur Leukocyte Esterase   (NEGATIVE)  


 


Urine RBC   (0-2/HPF)  


 


Urine WBC   (0-5/HPF)  


 


Ur Epithelial Cells   (NONE-FEW)  


 


Amorphous Sediment   (NEGATIVE)  


 


Urine Bacteria   (NEGATIVE)  


 


Urine Mucus   (NONE-MOD)  











Med Orders - Current: 


 Current Medications





Acetaminophen (Tylenol)  650 mg PO Q4H PRN


   PRN Reason: Pain (Mild 1-3)/fever


   Last Admin: 10/04/18 19:43 Dose:  650 mg


Albuterol (Ventolin Hfa)  0 gm INH Q6H PRN


   PRN Reason: Shortness of Breath


Carvedilol (Coreg)  6.25 mg PO BID [facility]


   Last Admin: 10/05/18 08:28 Dose:  6.25 mg


Docusate Sodium (Colace)  100 mg PO BID PRN


   PRN Reason: Constipation


Sodium Chloride (Normal Saline)  1,000 mls @ 100 mls/hr IV ASDIRECTED [facility]


   Last Admin: 10/05/18 13:11 Dose:  100 mls/hr


Insulin Aspart (Novolog)  0 unit SUBCUT TIDAC [facility]; Protocol


   Last Admin: 10/05/18 13:07 Dose:  3 units


Levothyroxine Sodium (Synthroid)  100 mcg PO ACBREAKFAST [facility]


   Last Admin: 10/05/18 07:03 Dose:  100 mcg


Omeprazole (Omeprazole)  20 mg PO ACBREAKFAST [facility]


   Last Admin: 10/05/18 07:03 Dose:  20 mg


Ondansetron HCl (Zofran Odt)  4 mg PO Q4H PRN


   PRN Reason: nausea, able to take PO


Ondansetron HCl (Zofran)  4 mg IVPUSH Q4H PRN


   PRN Reason: Nausea


Oxycodone HCl (Oxycodone)  5 mg PO Q4H PRN


   PRN Reason: Pain (moderate 4-6)


Polyethylene Glycol (Miralax)  17 gm PO DAILY PRN


   PRN Reason: Constipation


   Last Admin: 10/04/18 19:43 Dose:  17 gm


Prednisone (Prednisone)  5 mg PO DAILY [facility]


   Last Admin: 10/05/18 08:28 Dose:  5 mg


Temazepam (Restoril)  15 mg PO BEDTIME PRN


   PRN Reason: Sleep


Warfarin Sodium (Coumadin)  7.5 mg PO TuThSa@[id number] [facility]


   Last Admin: 10/04/18 15:00 Dose:  7.5 mg


Warfarin Sodium (Coumadin)  5 mg PO SuMoWeFr@1400 [facility]


   Last Admin: 10/05/18 14:34 Dose:  5 mg





Discontinued Medications





Albuterol (Proventil Hfa)  0 gm INH Q6H PRN


   PRN Reason: Shortness of Breath


Albuterol/Ipratropium (Duoneb 3.0-0.5 Mg/3 Ml)  3 ml NEB ONETIME ONE


   Stop: 10/04/18 11:44


   Last Admin: 10/04/18 11:54 Dose:  3 ml


Sodium Chloride (Normal Saline)  500 mls @ 999 mls/hr IV .BOLUS ONE


   Stop: 10/04/18 11:43


   Last Infusion: 10/04/18 11:45 Dose:  125 mls/hr


Magnesium Sulfate 2 gm/ Premix  50 mls @ 25 mls/hr IV ONETIME ONE


   Stop: 10/05/18 08:51


   Last Admin: 10/05/18 08:28 Dose:  25 mls/hr


Ibuprofen (Motrin)  600 mg PO Q6H PRN


   PRN Reason: Pain (mild 1-3)











- Exam


General: Alert, Oriented, Cooperative


Lungs: Clear to Auscultation, Normal Respiratory Effort


Cardiovascular: Regular Rate


GI/Abdominal Exam: Normal Bowel Sounds, Soft, Non-Tender


Extremities: Non-Tender, No Pedal Edema


Skin: Warm, Dry





- Problem List Review


Problem List Initiated/Reviewed/Updated: Yes





- My Orders


Last 24 Hours: 


My Active Orders





10/04/18 21:00


Carvedilol [Coreg]   6.25 mg PO BID 





10/04/18 Dinner


Neutropenic [DIET] 





10/05/18 07:30


Insulin Aspart [NovoLOG]   See Protocol  SUBCUT TIDAC 


Levothyroxine [Synthroid]   100 mcg PO ACBREAKFAST 


Omeprazole   20 mg PO ACBREAKFAST 





10/05/18 09:00


predniSONE   5 mg PO DAILY 





10/05/18 09:17


Admission Status [Patient Status] [ADT] Routine 





10/06/18 05:11


CBC WITH AUTO DIFF [HEME] AM 


COMPREHENSIVE METABOLIC PN,CMP [CHEM] AM 


INR,PT,PROTHROMBIN TIME [COAG] AM 





10/07/18 05:11


CBC WITH AUTO DIFF [HEME] AM 


COMPREHENSIVE METABOLIC PN,CMP [CHEM] AM 


INR,PT,PROTHROMBIN TIME [COAG] AM 














- Plan


Plan:: 








1. Generalized weakness, improving. Likely secondary to her cancer and 

chemotherapy adverse effects.  Will continue to hydrate and monitor. Ordered PT/

OT.


2. Pancytopenia, improving. Secondary to chemotherapy. On neutropenic 

precautions.


3. Elevated troponin, normalized. No chest pain.


4. Hyperglycemia- Will order HgA1c and monitor her blood glucose. Ordered ISS 

for now.


5. Chronic kidney disease,stage III. I will continue with maintenance fluids 

for now and monitor her kidney function.


6. Hypothyroidism. TSH of 16. I suspect that this may be reactive. Will 

continue current home dose of synthroid. Patient will need to check her TSH as 

outpatient after discharge.


7. PMH pumonary embolism- will continue her home dose of warfarin and adjust 

according to INR levels.


8. Hypomagenesemia- replaced with MgS 2 g IV once. Recheck tomorrow.





Dispo: [facility] after more improvement.

## 2018-10-06 LAB
CHLORIDE SERPL-SCNC: 109 MMOL/L (ref 98–107)
SODIUM SERPL-SCNC: 141 MMOL/L (ref 136–145)

## 2018-10-06 PROCEDURE — 30233N1 TRANSFUSION OF NONAUTOLOGOUS RED BLOOD CELLS INTO PERIPHERAL VEIN, PERCUTANEOUS APPROACH: ICD-10-PCS | Performed by: INTERNAL MEDICINE

## 2018-10-06 RX ADMIN — SODIUM CHLORIDE AND POTASSIUM CHLORIDE SCH MLS/HR: .9; .15 SOLUTION INTRAVENOUS at 09:34

## 2018-10-06 RX ADMIN — OMEPRAZOLE SCH MG: 20 CAPSULE, DELAYED RELEASE ORAL at 06:33

## 2018-10-06 NOTE — PCM.PN
- General Info


Date of Service: 10/06/18


Subjective Update: 





no concerns this morning. Appears fatigued which is her baseline. No pain 

reported or symptoms of concern. 





- Review of Systems


General: Reports: Other (see hpi)





- Patient Data


Vitals - Most Recent: 


 Last Vital Signs











Temp  37.6 C   10/06/18 11:00


 


Pulse  92   10/06/18 11:00


 


Resp  16   10/06/18 11:00


 


BP  96/54 L  10/06/18 11:00


 


Pulse Ox  91 L  10/06/18 11:00











Weight - Most Recent: 74.843 kg


I&O - Last 24 Hours: 


 Intake & Output











 10/05/18 10/06/18 10/06/18





 22:59 06:59 14:59


 


Intake Total 1908 2059 


 


Output Total 0  


 


Balance 1908 2059 











Lab Results Last 24 Hours: 


 Laboratory Results - last 24 hr











  10/05/18 10/05/18 10/06/18 Range/Units





  12:51 17:12 05:48 


 


WBC     (4.0-11.0)  K/uL


 


RBC     (4.30-5.90)  M/uL


 


Hgb     (12.0-16.0)  g/dL


 


Hct     (36.0-46.0)  %


 


MCV     (80.0-98.0)  fL


 


MCH     (27.0-32.0)  pg


 


MCHC     (31.0-37.0)  g/dL


 


RDW Std Deviation     (28.0-62.0)  fl


 


RDW Coeff of Castillo     (11.0-15.0)  %


 


Plt Count     (150-400)  K/uL


 


MPV     (7.40-12.00)  fL


 


Add Manual Diff     


 


Neutrophils % (Manual)     


 


Band Neutrophils %     


 


Lymphocytes % (Manual)     


 


Atypical Lymphs %     


 


Immat Monocytes % (Man)     


 


Monocytes % (Manual)     


 


Eosinophils % (Manual)     


 


Basophils % (Manual)     


 


Metamyelocytes %     


 


Myelocytes %     


 


Promyelocytes %     


 


Blast Cells %     


 


Plasma Cell % (Manual)     


 


Nucleated RBC %     /100WBC


 


Immature Gran #     


 


Absolute Neutrophils     


 


Absolute Seg Neuts     


 


Band Neutrophils #     


 


Lymphocytes # (Manual)     


 


Monocytes # (Manual)     


 


Eosinophils # (Manual)     


 


Basophils # (Manual)     


 


Absolute Metamyelocyte     


 


Absolute Myelocytes     


 


Absolute Promyelocytes     


 


Absolute Plasma Cells     


 


Nucleated RBCs     


 


Nucleated RBCs #     K/uL


 


Differential Comment     


 


Pathologist Review     


 


Bilobed Neuts     


 


Hypersegmented Neuts     


 


Variant Lymphocytes     


 


Atypical Lymphocytes     


 


Abnormal Lymphocytes     


 


Plasmacytoid Lymphs     


 


Reactive Lymphocytes     


 


Vacuolated Monocytes     


 


Absolute Blast Cells     


 


Smudge Cells     


 


Toxic Granulation     


 


Dohle Bodies     


 


Pelger-Huet Cells     


 


Megakaryocytic Frags     


 


[place] Rods     


 


WBC Morphology Comment     


 


Platelet Estimate     


 


Hypogranular Platelets     


 


Platelet Agranulation     


 


Clumped Platelets     


 


Giant Platelets     


 


Platelet Satelliting     


 


Bizarre Platelets     


 


Plt Morphology Comment     


 


Polychromasia     


 


Hypochromasia     


 


Poikilocytosis     


 


Basophilic Stippling     


 


Anisocytosis     


 


Microcytosis     


 


Macrocytosis     


 


Spherocytes     


 


Micro Spherocytes     


 


Pappenheimer Bodies     


 


Siderocytes     


 


Sickle Cells     


 


Target Cells     


 


Tear Drop Cells     


 


Ovalocytes     


 


Stomatocytes     


 


Helmet Cells     


 


Pena-Ona Bodies     


 


Cabot Rings     


 


Roman Cells     


 


Elliptocytes     


 


Acanthocytes (Spur)     


 


Rouleaux     


 


Hemoglobin C Crystals     


 


Schistocytes     


 


RBC Morph Comment     


 


Smear Path Review     


 


Tomi Bodies     


 


INR    2.46  


 


Sodium     (136-145)  mmol/L


 


Potassium     (3.5-5.1)  mmol/L


 


Chloride     ()  mmol/L


 


Carbon Dioxide     (21.0-32.0)  mmol/L


 


BUN     (7.0-18.0)  mg/dL


 


Creatinine     (0.6-1.0)  mg/dL


 


Est Cr Clr Drug Dosing     mL/min


 


Estimated GFR (MDRD)     ml/min


 


Glucose     ()  mg/dL


 


POC Glucose  291 H  103   ()  mg/dL


 


Hemoglobin A1c     (4.5-6.2)  %


 


Calcium     (8.5-10.1)  mg/dL


 


Magnesium     (1.8-2.4)  mg/dL


 


Total Bilirubin     (0.2-1.0)  mg/dL


 


AST     (15-37)  IU/L


 


ALT     (14-63)  IU/L


 


Alkaline Phosphatase     ()  U/L


 


Total Protein     (6.4-8.2)  g/dL


 


Albumin     (3.4-5.0)  g/dL


 


Globulin     (2.0-3.5)  g/dL


 


Albumin/Globulin Ratio     (1.3-2.8)  


 


Bld Parasites Quantity     


 


Slides for Path Review     


 


Blood Type     


 


Antibody Screen     


 


Crossmatch     














  10/06/18 10/06/18 10/06/18 Range/Units





  05:48 05:48 05:48 


 


WBC  0.12 L    (4.0-11.0)  K/uL


 


RBC  2.30 L    (4.30-5.90)  M/uL


 


Hgb  6.4 L    (12.0-16.0)  g/dL


 


Hct  19.9 L    (36.0-46.0)  %


 


MCV  86.5    (80.0-98.0)  fL


 


MCH  27.8    (27.0-32.0)  pg


 


MCHC  32.2    (31.0-37.0)  g/dL


 


RDW Std Deviation  64.8 H    (28.0-62.0)  fl


 


RDW Coeff of Castillo  21 H    (11.0-15.0)  %


 


Plt Count  54 L    (150-400)  K/uL


 


MPV  8.60    (7.40-12.00)  fL


 


Add Manual Diff  NO    


 


Neutrophils % (Manual)  Cancelled    


 


Band Neutrophils %  Cancelled    


 


Lymphocytes % (Manual)  Cancelled    


 


Atypical Lymphs %  Cancelled    


 


Immat Monocytes % (Man)  Cancelled    


 


Monocytes % (Manual)  Cancelled    


 


Eosinophils % (Manual)  Cancelled    


 


Basophils % (Manual)  Cancelled    


 


Metamyelocytes %  Cancelled    


 


Myelocytes %  Cancelled    


 


Promyelocytes %  Cancelled    


 


Blast Cells %  Cancelled    


 


Plasma Cell % (Manual)  Cancelled    


 


Nucleated RBC %  0.0    /100WBC


 


Immature Gran #  Cancelled    


 


Absolute Neutrophils  Cancelled    


 


Absolute Seg Neuts  Cancelled    


 


Band Neutrophils #  Cancelled    


 


Lymphocytes # (Manual)  Cancelled    


 


Monocytes # (Manual)  Cancelled    


 


Eosinophils # (Manual)  Cancelled    


 


Basophils # (Manual)  Cancelled    


 


Absolute Metamyelocyte  Cancelled    


 


Absolute Myelocytes  Cancelled    


 


Absolute Promyelocytes  Cancelled    


 


Absolute Plasma Cells  Cancelled    


 


Nucleated RBCs  Cancelled    


 


Nucleated RBCs #  0    K/uL


 


Differential Comment  Cancelled    


 


Pathologist Review  Cancelled    


 


Bilobed Neuts  Cancelled    


 


Hypersegmented Neuts  Cancelled    


 


Variant Lymphocytes  Cancelled    


 


Atypical Lymphocytes  Cancelled    


 


Abnormal Lymphocytes  Cancelled    


 


Plasmacytoid Lymphs  Cancelled    


 


Reactive Lymphocytes  Cancelled    


 


Vacuolated Monocytes  Cancelled    


 


Absolute Blast Cells  Cancelled    


 


Smudge Cells  Cancelled    


 


Toxic Granulation  Cancelled    


 


Dohle Bodies  Cancelled    


 


Pelger-Huet Cells  Cancelled    


 


Megakaryocytic Frags  Cancelled    


 


[place] Rods  Cancelled    


 


WBC Morphology Comment  Cancelled    


 


Platelet Estimate  Cancelled    


 


Hypogranular Platelets  Cancelled    


 


Platelet Agranulation  Cancelled    


 


Clumped Platelets  Cancelled    


 


Giant Platelets  Cancelled    


 


Platelet Satelliting  Cancelled    


 


Bizarre Platelets  Cancelled    


 


Plt Morphology Comment  Cancelled    


 


Polychromasia  Cancelled    


 


Hypochromasia  Cancelled    


 


Poikilocytosis  Cancelled    


 


Basophilic Stippling  Cancelled    


 


Anisocytosis  Cancelled    


 


Microcytosis  Cancelled    


 


Macrocytosis  Cancelled    


 


Spherocytes  Cancelled    


 


Micro Spherocytes  Cancelled    


 


Pappenheimer Bodies  Cancelled    


 


Siderocytes  Cancelled    


 


Sickle Cells  Cancelled    


 


Target Cells  Cancelled    


 


Tear Drop Cells  Cancelled    


 


Ovalocytes  Cancelled    


 


Stomatocytes  Cancelled    


 


Helmet Cells  Cancelled    


 


Pena-Jolly Bodies  Cancelled    


 


Cabot Rings  Cancelled    


 


[place] Cells  Cancelled    


 


Elliptocytes  Cancelled    


 


Acanthocytes (Spur)  Cancelled    


 


Rouleaux  Cancelled    


 


Hemoglobin C Crystals  Cancelled    


 


Schistocytes  Cancelled    


 


RBC Morph Comment  Cancelled    


 


Smear Path Review  Cancelled    


 


Tomi Bodies  Cancelled    


 


INR     


 


Sodium   141   (136-145)  mmol/L


 


Potassium   2.8 L   (3.5-5.1)  mmol/L


 


Chloride   109 H   ()  mmol/L


 


Carbon Dioxide   23.1   (21.0-32.0)  mmol/L


 


BUN   18   (7.0-18.0)  mg/dL


 


Creatinine   0.8   (0.6-1.0)  mg/dL


 


Est Cr Clr Drug Dosing   60.54   mL/min


 


Estimated GFR (MDRD)   > 60.0   ml/min


 


Glucose   94   ()  mg/dL


 


POC Glucose     ()  mg/dL


 


Hemoglobin A1c    7.4 H  (4.5-6.2)  %


 


Calcium   7.4 L   (8.5-10.1)  mg/dL


 


Magnesium     (1.8-2.4)  mg/dL


 


Total Bilirubin   0.6   (0.2-1.0)  mg/dL


 


AST   10 L   (15-37)  IU/L


 


ALT   20   (14-63)  IU/L


 


Alkaline Phosphatase   58   ()  U/L


 


Total Protein   4.7 L   (6.4-8.2)  g/dL


 


Albumin   1.7 L   (3.4-5.0)  g/dL


 


Globulin   3.0   (2.0-3.5)  g/dL


 


Albumin/Globulin Ratio   0.6 L   (1.3-2.8)  


 


Bld Parasites Quantity  Cancelled    


 


Slides for Path Review  Cancelled    


 


Blood Type     


 


Antibody Screen     


 


Crossmatch     














  10/06/18 10/06/18 Range/Units





  06:24 06:24 


 


WBC    (4.0-11.0)  K/uL


 


RBC    (4.30-5.90)  M/uL


 


Hgb    (12.0-16.0)  g/dL


 


Hct    (36.0-46.0)  %


 


MCV    (80.0-98.0)  fL


 


MCH    (27.0-32.0)  pg


 


MCHC    (31.0-37.0)  g/dL


 


RDW Std Deviation    (28.0-62.0)  fl


 


RDW Coeff of Castillo    (11.0-15.0)  %


 


Plt Count    (150-400)  K/uL


 


MPV    (7.40-12.00)  fL


 


Add Manual Diff    


 


Neutrophils % (Manual)    


 


Band Neutrophils %    


 


Lymphocytes % (Manual)    


 


Atypical Lymphs %    


 


Immat Monocytes % (Man)    


 


Monocytes % (Manual)    


 


Eosinophils % (Manual)    


 


Basophils % (Manual)    


 


Metamyelocytes %    


 


Myelocytes %    


 


Promyelocytes %    


 


Blast Cells %    


 


Plasma Cell % (Manual)    


 


Nucleated RBC %    /100WBC


 


Immature Gran #    


 


Absolute Neutrophils    


 


Absolute Seg Neuts    


 


Band Neutrophils #    


 


Lymphocytes # (Manual)    


 


Monocytes # (Manual)    


 


Eosinophils # (Manual)    


 


Basophils # (Manual)    


 


Absolute Metamyelocyte    


 


Absolute Myelocytes    


 


Absolute Promyelocytes    


 


Absolute Plasma Cells    


 


Nucleated RBCs    


 


Nucleated RBCs #    K/uL


 


Differential Comment    


 


Pathologist Review    


 


Bilobed Neuts    


 


Hypersegmented Neuts    


 


Variant Lymphocytes    


 


Atypical Lymphocytes    


 


Abnormal Lymphocytes    


 


Plasmacytoid Lymphs    


 


Reactive Lymphocytes    


 


Vacuolated Monocytes    


 


Absolute Blast Cells    


 


Smudge Cells    


 


Toxic Granulation    


 


Dohle Bodies    


 


Pelger-Huet Cells    


 


Megakaryocytic Frags    


 


Tapan Rods    


 


WBC Morphology Comment    


 


Platelet Estimate    


 


Hypogranular Platelets    


 


Platelet Agranulation    


 


Clumped Platelets    


 


Giant Platelets    


 


Platelet Satelliting    


 


Bizarre Platelets    


 


Plt Morphology Comment    


 


Polychromasia    


 


Hypochromasia    


 


Poikilocytosis    


 


Basophilic Stippling    


 


Anisocytosis    


 


Microcytosis    


 


Macrocytosis    


 


Spherocytes    


 


Micro Spherocytes    


 


Pappenheimer Bodies    


 


Siderocytes    


 


Sickle Cells    


 


Target Cells    


 


Tear Drop Cells    


 


Ovalocytes    


 


Stomatocytes    


 


Helmet Cells    


 


Pena-Ona Bodies    


 


Cabot Rings    


 


[place] Cells    


 


Elliptocytes    


 


Acanthocytes (Spur)    


 


Rouleaux    


 


Hemoglobin C Crystals    


 


Schistocytes    


 


RBC Morph Comment    


 


Smear Path Review    


 


Tomi Bodies    


 


INR    


 


Sodium    (136-145)  mmol/L


 


Potassium    (3.5-5.1)  mmol/L


 


Chloride    ()  mmol/L


 


Carbon Dioxide    (21.0-32.0)  mmol/L


 


BUN    (7.0-18.0)  mg/dL


 


Creatinine    (0.6-1.0)  mg/dL


 


Est Cr Clr Drug Dosing    mL/min


 


Estimated GFR (MDRD)    ml/min


 


Glucose    ()  mg/dL


 


POC Glucose    ()  mg/dL


 


Hemoglobin A1c    (4.5-6.2)  %


 


Calcium    (8.5-10.1)  mg/dL


 


Magnesium  2.0   (1.8-2.4)  mg/dL


 


Total Bilirubin    (0.2-1.0)  mg/dL


 


AST    (15-37)  IU/L


 


ALT    (14-63)  IU/L


 


Alkaline Phosphatase    ()  U/L


 


Total Protein    (6.4-8.2)  g/dL


 


Albumin    (3.4-5.0)  g/dL


 


Globulin    (2.0-3.5)  g/dL


 


Albumin/Globulin Ratio    (1.3-2.8)  


 


Bld Parasites Quantity    


 


Slides for Path Review    


 


Blood Type   A POSITIVE  


 


Antibody Screen   NEGATIVE  


 


Crossmatch   See Detail  











Med Orders - Current: 


 Current Medications





Acetaminophen (Tylenol)  650 mg PO Q4H PRN


   PRN Reason: Pain (Mild 1-3)/fever


   Last Admin: 10/05/18 18:22 Dose:  650 mg


Albuterol (Ventolin Hfa)  0 gm INH Q6H PRN


   PRN Reason: Shortness of Breath


Benzonatate (Tessalon Perles)  200 mg PO BID PRN


   PRN Reason: Cough


Docusate Sodium (Colace)  100 mg PO BID PRN


   PRN Reason: Constipation


Potassium Chloride/Sodium Chloride (Normal Saline With 20 Meq Kcl)  1,000 mls @ 

100 mls/hr IV ASDIRECTED [facility]


   Last Admin: 10/06/18 09:34 Dose:  100 mls/hr


Insulin Aspart (Novolog)  0 unit SUBCUT TIDAC [facility]; Protocol


   Last Admin: 10/06/18 06:34 Dose:  Not Given


Levothyroxine Sodium (Synthroid)  100 mcg PO ACBREAKFAST [facility]


   Last Admin: 10/06/18 06:33 Dose:  100 mcg


Ondansetron HCl (Zofran Odt)  4 mg PO Q4H PRN


   PRN Reason: nausea, able to take PO


Ondansetron HCl (Zofran)  4 mg IVPUSH Q4H PRN


   PRN Reason: Nausea


Oxycodone HCl (Oxycodone)  5 mg PO Q4H PRN


   PRN Reason: Pain (moderate 4-6)


Pantoprazole Sodium (Protonix***)  40 mg PO ACBREAKFAST [facility]


Polyethylene Glycol (Miralax)  17 gm PO DAILY PRN


   PRN Reason: Constipation


   Last Admin: 10/04/18 19:43 Dose:  17 gm


Prednisone (Prednisone)  5 mg PO DAILY [facility]


   Last Admin: 10/06/18 09:38 Dose:  5 mg


Temazepam (Restoril)  15 mg PO BEDTIME PRN


   PRN Reason: Sleep


Warfarin Sodium (Coumadin)  7.5 mg PO TuThSa@1400 [facility]


   Last Admin: 10/04/18 15:00 Dose:  7.5 mg


Warfarin Sodium (Coumadin)  5 mg PO SuMoWeFr@1400 [facility]


   Last Admin: 10/05/18 14:34 Dose:  5 mg





Discontinued Medications





Albuterol (Proventil Hfa)  0 gm INH Q6H PRN


   PRN Reason: Shortness of Breath


Albuterol/Ipratropium (Duoneb 3.0-0.5 Mg/3 Ml)  3 ml NEB ONETIME ONE


   Stop: 10/04/18 11:44


   Last Admin: 10/04/18 11:54 Dose:  3 ml


Carvedilol (Coreg)  6.25 mg PO BID [facility]


   Last Admin: 10/05/18 20:27 Dose:  6.25 mg


Sodium Chloride (Normal Saline)  500 mls @ 999 mls/hr IV .BOLUS ONE


   Stop: 10/04/18 11:43


   Last Infusion: 10/04/18 11:45 Dose:  125 mls/hr


Sodium Chloride (Normal Saline)  1,000 mls @ 100 mls/hr IV ASDIRECTED [facility]


   Last Infusion: 10/06/18 04:19 Dose:  100 mls/hr


Magnesium Sulfate 2 gm/ Premix  50 mls @ 25 mls/hr IV ONETIME ONE


   Stop: 10/05/18 08:51


   Last Admin: 10/05/18 08:28 Dose:  25 mls/hr


Ibuprofen (Motrin)  600 mg PO Q6H PRN


   PRN Reason: Pain (mild 1-3)


Omeprazole (Omeprazole)  20 mg PO ACBREAKFAST [facility]


   Last Admin: 10/06/18 06:33 Dose:  20 mg


Potassium Chloride (Klor-Con M20)  40 meq PO DAILY [facility]


Potassium Chloride (Klor-Con M20)  40 meq PO DAILY ONE


   Stop: 10/06/18 08:05


   Last Admin: 10/06/18 09:38 Dose:  40 meq











- Exam


General: Alert, Oriented


HEENT: Pupils Equal, Pupils Reactive, EOMI, Mucous Membr. Moist/Pink


Neck: Supple


Lungs: Clear to Auscultation, Normal Respiratory Effort


Cardiovascular: Regular Rate, Regular Rhythm


Back Exam: Normal Inspection, Full Range of Motion


Extremities: Normal Inspection, Normal Range of Motion, Non-Tender, No Pedal 

Edema, Normal Capillary Refill


Peripheral Pulses: 1+: Dorsalis Pedis (L), Dorsalis Pedis (R)


Skin: Warm, Dry


Psy/Mental Status: Alert, Normal Affect, Normal Mood





- Problem List Review


Problem List Initiated/Reviewed/Updated: Yes





- My Orders


Last 24 Hours: 


My Active Orders





10/06/18 06:24


RED BLOOD CELLS LP [BBK] Routine 


TYPE AND SCREEN [BBK] Routine 





10/06/18 08:15


NS + KCl 20mEq/L [Normal Saline with 20 mEq KCl] 1,000 ml IV ASDIRECTED 





10/06/18 09:57


Transfuse Red Blood Cells [COMM] Routine 





10/07/18 07:30


Pantoprazole [ProTONIX***]   40 mg PO ACBREAKFAST 














- Plan


Plan:: 





A:


1. Microcytic anemia 


2. Generalized weakness 


3. hx of lung cancer with mets to brain, kidney


4. hypokalemia 





P:


#1. Transfuse 2 units 


#2. Start Protonix 40mg daily. Likely source of anemia is her known pancytopenia

/chemotherapy but she is also on prednisone so will start this for GI 

prophylaxis. 


#3. IVNS + 20KCl 100ml/h


#4. Recheck H/H after transfusion.


#5. CBC, BMP tomorrow AM. Will need SNF placement.

## 2018-10-07 LAB
CHLORIDE SERPL-SCNC: 110 MMOL/L (ref 98–107)
CHLORIDE SERPL-SCNC: 111 MMOL/L (ref 98–107)
SODIUM SERPL-SCNC: 142 MMOL/L (ref 136–145)
SODIUM SERPL-SCNC: 144 MMOL/L (ref 136–145)

## 2018-10-07 RX ADMIN — POTASSIUM CHLORIDE SCH MEQ: 1.5 SOLUTION ORAL at 10:07

## 2018-10-07 RX ADMIN — SODIUM CHLORIDE AND POTASSIUM CHLORIDE SCH MLS/HR: .9; .15 SOLUTION INTRAVENOUS at 04:14

## 2018-10-07 RX ADMIN — Medication SCH ML: at 23:00

## 2018-10-07 RX ADMIN — POTASSIUM CHLORIDE SCH MEQ: 1.5 SOLUTION ORAL at 12:10

## 2018-10-07 RX ADMIN — Medication SCH ML: at 18:15

## 2018-10-07 NOTE — PCM.PN
- General Info


Date of Service: 10/07/18


Subjective Update: 





S/p transfusion 2 units PRBCs. Feeling better. Denies chest pain, abdominal pain

, dysuria. She is having some diarrhea. No fevers.





- Patient Data


Vitals - Most Recent: 


 Last Vital Signs











Temp  37.2 C   10/07/18 04:00


 


Pulse  96   10/07/18 04:00


 


Resp  16   10/07/18 04:00


 


BP  110/62   10/07/18 04:00


 


Pulse Ox  92 L  10/07/18 04:00











Weight - Most Recent: 74.843 kg


I&O - Last 24 Hours: 


 Intake & Output











 10/06/18 10/07/18 10/07/18





 22:59 06:59 14:59


 


Intake Total 1689 1150 


 


Balance 1689 1150 











Lab Results Last 24 Hours: 


 Laboratory Results - last 24 hr











  10/06/18 10/06/18 10/06/18 Range/Units





  06:24 06:26 13:33 


 


WBC     (4.0-11.0)  K/uL


 


RBC     (4.30-5.90)  M/uL


 


Hgb     (12.0-16.0)  g/dL


 


Hct     (36.0-46.0)  %


 


MCV     (80.0-98.0)  fL


 


MCH     (27.0-32.0)  pg


 


MCHC     (31.0-37.0)  g/dL


 


RDW Std Deviation     (28.0-62.0)  fl


 


RDW Coeff of Castillo     (11.0-15.0)  %


 


Plt Count     (150-400)  K/uL


 


MPV     (7.40-12.00)  fL


 


Add Manual Diff     


 


INR     


 


Sodium     (136-145)  mmol/L


 


Potassium     (3.5-5.1)  mmol/L


 


Chloride     ()  mmol/L


 


Carbon Dioxide     (21.0-32.0)  mmol/L


 


BUN     (7.0-18.0)  mg/dL


 


Creatinine     (0.6-1.0)  mg/dL


 


Est Cr Clr Drug Dosing     mL/min


 


Estimated GFR (MDRD)     ml/min


 


Glucose     ()  mg/dL


 


POC Glucose   84  111 H  ()  mg/dL


 


Calcium     (8.5-10.1)  mg/dL


 


Magnesium     (1.8-2.4)  mg/dL


 


Total Bilirubin     (0.2-1.0)  mg/dL


 


AST     (15-37)  IU/L


 


ALT     (14-63)  IU/L


 


Alkaline Phosphatase     ()  U/L


 


Total Protein     (6.4-8.2)  g/dL


 


Albumin     (3.4-5.0)  g/dL


 


Globulin     (2.0-3.5)  g/dL


 


Albumin/Globulin Ratio     (1.3-2.8)  


 


Blood Type  A POSITIVE    


 


Antibody Screen  NEGATIVE    


 


Crossmatch  See Detail    














  10/06/18 10/06/18 10/07/18 Range/Units





  16:35 21:15 06:17 


 


WBC     (4.0-11.0)  K/uL


 


RBC     (4.30-5.90)  M/uL


 


Hgb   9.3 L   (12.0-16.0)  g/dL


 


Hct   27.5 L   (36.0-46.0)  %


 


MCV     (80.0-98.0)  fL


 


MCH     (27.0-32.0)  pg


 


MCHC     (31.0-37.0)  g/dL


 


RDW Std Deviation     (28.0-62.0)  fl


 


RDW Coeff of Castillo     (11.0-15.0)  %


 


Plt Count     (150-400)  K/uL


 


MPV     (7.40-12.00)  fL


 


Add Manual Diff     


 


INR    3.39  


 


Sodium     (136-145)  mmol/L


 


Potassium     (3.5-5.1)  mmol/L


 


Chloride     ()  mmol/L


 


Carbon Dioxide     (21.0-32.0)  mmol/L


 


BUN     (7.0-18.0)  mg/dL


 


Creatinine     (0.6-1.0)  mg/dL


 


Est Cr Clr Drug Dosing     mL/min


 


Estimated GFR (MDRD)     ml/min


 


Glucose     ()  mg/dL


 


POC Glucose  126 H    ()  mg/dL


 


Calcium     (8.5-10.1)  mg/dL


 


Magnesium     (1.8-2.4)  mg/dL


 


Total Bilirubin     (0.2-1.0)  mg/dL


 


AST     (15-37)  IU/L


 


ALT     (14-63)  IU/L


 


Alkaline Phosphatase     ()  U/L


 


Total Protein     (6.4-8.2)  g/dL


 


Albumin     (3.4-5.0)  g/dL


 


Globulin     (2.0-3.5)  g/dL


 


Albumin/Globulin Ratio     (1.3-2.8)  


 


Blood Type     


 


Antibody Screen     


 


Crossmatch     














  10/07/18 10/07/18 10/07/18 Range/Units





  06:17 06:17 06:17 


 


WBC  0.33 L    (4.0-11.0)  K/uL


 


RBC  3.15 L    (4.30-5.90)  M/uL


 


Hgb  8.9 L    (12.0-16.0)  g/dL


 


Hct  26.5 L    (36.0-46.0)  %


 


MCV  84.1    (80.0-98.0)  fL


 


MCH  28.3    (27.0-32.0)  pg


 


MCHC  33.6    (31.0-37.0)  g/dL


 


RDW Std Deviation  61.0    (28.0-62.0)  fl


 


RDW Coeff of Castillo  20 H    (11.0-15.0)  %


 


Plt Count  27 L    (150-400)  K/uL


 


MPV  9.80    (7.40-12.00)  fL


 


Add Manual Diff      


 


INR     


 


Sodium   144   (136-145)  mmol/L


 


Potassium   2.6 L   (3.5-5.1)  mmol/L


 


Chloride   111 H   ()  mmol/L


 


Carbon Dioxide   20.1 L   (21.0-32.0)  mmol/L


 


BUN   15   (7.0-18.0)  mg/dL


 


Creatinine   0.8   (0.6-1.0)  mg/dL


 


Est Cr Clr Drug Dosing   60.54   mL/min


 


Estimated GFR (MDRD)   > 60.0   ml/min


 


Glucose   82   ()  mg/dL


 


POC Glucose     ()  mg/dL


 


Calcium   7.9 L   (8.5-10.1)  mg/dL


 


Magnesium    1.7 L  (1.8-2.4)  mg/dL


 


Total Bilirubin   1.0   (0.2-1.0)  mg/dL


 


AST   8 L   (15-37)  IU/L


 


ALT   22   (14-63)  IU/L


 


Alkaline Phosphatase   66   ()  U/L


 


Total Protein   4.9 L   (6.4-8.2)  g/dL


 


Albumin   1.6 L   (3.4-5.0)  g/dL


 


Globulin   3.3   (2.0-3.5)  g/dL


 


Albumin/Globulin Ratio   0.5 L   (1.3-2.8)  


 


Blood Type     


 


Antibody Screen     


 


Crossmatch     














  10/07/18 Range/Units





  06:26 


 


WBC   (4.0-11.0)  K/uL


 


RBC   (4.30-5.90)  M/uL


 


Hgb   (12.0-16.0)  g/dL


 


Hct   (36.0-46.0)  %


 


MCV   (80.0-98.0)  fL


 


MCH   (27.0-32.0)  pg


 


MCHC   (31.0-37.0)  g/dL


 


RDW Std Deviation   (28.0-62.0)  fl


 


RDW Coeff of Castillo   (11.0-15.0)  %


 


Plt Count   (150-400)  K/uL


 


MPV   (7.40-12.00)  fL


 


Add Manual Diff   


 


INR   


 


Sodium   (136-145)  mmol/L


 


Potassium   (3.5-5.1)  mmol/L


 


Chloride   ()  mmol/L


 


Carbon Dioxide   (21.0-32.0)  mmol/L


 


BUN   (7.0-18.0)  mg/dL


 


Creatinine   (0.6-1.0)  mg/dL


 


Est Cr Clr Drug Dosing   mL/min


 


Estimated GFR (MDRD)   ml/min


 


Glucose   ()  mg/dL


 


POC Glucose  91  ()  mg/dL


 


Calcium   (8.5-10.1)  mg/dL


 


Magnesium   (1.8-2.4)  mg/dL


 


Total Bilirubin   (0.2-1.0)  mg/dL


 


AST   (15-37)  IU/L


 


ALT   (14-63)  IU/L


 


Alkaline Phosphatase   ()  U/L


 


Total Protein   (6.4-8.2)  g/dL


 


Albumin   (3.4-5.0)  g/dL


 


Globulin   (2.0-3.5)  g/dL


 


Albumin/Globulin Ratio   (1.3-2.8)  


 


Blood Type   


 


Antibody Screen   


 


Crossmatch   











Med Orders - Current: 


 Current Medications





Acetaminophen (Tylenol)  650 mg PO Q4H PRN


   PRN Reason: Pain (Mild 1-3)/fever


   Last Admin: 10/06/18 13:39 Dose:  650 mg


Albuterol (Ventolin Hfa)  0 gm INH Q6H PRN


   PRN Reason: Shortness of Breath


Benzonatate (Tessalon Perles)  200 mg PO BID PRN


   PRN Reason: Cough


Docusate Sodium (Colace)  100 mg PO BID PRN


   PRN Reason: Constipation


Potassium Chloride/Sodium Chloride (Normal Saline With 20 Meq Kcl)  1,000 mls @ 

100 mls/hr IV ASDIRECTED [facility]


   Last Admin: 10/07/18 04:14 Dose:  100 mls/hr


Magnesium Sulfate 2 gm/ Premix  50 mls @ 25 mls/hr IV ONETIME ONE


   Stop: 10/07/18 10:36


Insulin Aspart (Novolog)  0 unit SUBCUT TIDAC [facility]; Protocol


   Last Admin: 10/07/18 06:30 Dose:  Not Given


Levothyroxine Sodium (Synthroid)  100 mcg PO ACBREAKFAST [facility]


   Last Admin: 10/07/18 06:30 Dose:  100 mcg


Ondansetron HCl (Zofran Odt)  4 mg PO Q4H PRN


   PRN Reason: nausea, able to take PO


Ondansetron HCl (Zofran)  4 mg IVPUSH Q4H PRN


   PRN Reason: Nausea


Oxycodone HCl (Oxycodone)  5 mg PO Q4H PRN


   PRN Reason: Pain (moderate 4-6)


Pantoprazole Sodium (Protonix***)  40 mg PO ACBREAKFAST [facility]


   Last Admin: 10/07/18 06:30 Dose:  40 mg


Polyethylene Glycol (Miralax)  17 gm PO DAILY PRN


   PRN Reason: Constipation


   Last Admin: 10/04/18 19:43 Dose:  17 gm


Potassium Chloride (Potassium Chloride)  40 meq PO Q4H [facility]


   Stop: 10/07/18 11:46


Prednisone (Prednisone)  5 mg PO DAILY [facility]


   Last Admin: 10/06/18 09:38 Dose:  5 mg


Temazepam (Restoril)  15 mg PO BEDTIME PRN


   PRN Reason: Sleep


Warfarin Sodium (Coumadin)  7.5 mg PO [id number] [facility]


   Last Admin: 10/06/18 15:02 Dose:  7.5 mg


Warfarin Sodium (Coumadin)  5 mg PO [email or web address]@[id number] [facility]


   Last Admin: 10/05/18 14:34 Dose:  5 mg





Discontinued Medications





Albuterol (Proventil Hfa)  0 gm INH Q6H PRN


   PRN Reason: Shortness of Breath


Albuterol/Ipratropium (Duoneb 3.0-0.5 Mg/3 Ml)  3 ml NEB ONETIME ONE


   Stop: 10/04/18 11:44


   Last Admin: 10/04/18 11:54 Dose:  3 ml


Carvedilol (Coreg)  6.25 mg PO BID [facility]


   Last Admin: 10/05/18 20:27 Dose:  6.25 mg


Sodium Chloride (Normal Saline)  500 mls @ 999 mls/hr IV .BOLUS ONE


   Stop: 10/04/18 11:43


   Last Infusion: 10/04/18 11:45 Dose:  125 mls/hr


Sodium Chloride (Normal Saline)  1,000 mls @ 100 mls/hr IV ASDIRECTED [facility]


   Last Infusion: 10/06/18 04:19 Dose:  100 mls/hr


Magnesium Sulfate 2 gm/ Premix  50 mls @ 25 mls/hr IV ONETIME ONE


   Stop: 10/05/18 08:51


   Last Admin: 10/05/18 08:28 Dose:  25 mls/hr


Ibuprofen (Motrin)  600 mg PO Q6H PRN


   PRN Reason: Pain (mild 1-3)


Omeprazole (Omeprazole)  20 mg PO ACBREAKFAST [facility]


   Last Admin: 10/06/18 06:33 Dose:  20 mg


Potassium Chloride (Klor-Con M20)  40 meq PO DAILY [facility]


Potassium Chloride (Klor-Con M20)  40 meq PO DAILY ONE


   Stop: 10/06/18 08:05


   Last Admin: 10/06/18 09:38 Dose:  40 meq











- Exam


General: Alert, Oriented, Cooperative


Lungs: Normal Respiratory Effort, Other (rales bilaterally in lower lobes.)


Cardiovascular: Regular Rate, Regular Rhythm


GI/Abdominal Exam: Normal Bowel Sounds, Soft, Non-Tender


Extremities: No Pedal Edema





- Problem List Review


Problem List Initiated/Reviewed/Updated: Yes





- My Orders


Last 24 Hours: 


My Active Orders





10/07/18 07:45


Potassium Chloride   40 meq PO Q4H 





10/07/18 08:37


Magnesium Sulfate/Water [Magnesium Sulfate 2 GM in Water 50 ML] 2 gm   Premix 

Bag 1 bag IV ONETIME 














- Plan


Plan:: 





A:


1. normocytic anemia s/p transfusion 2 units PRBCs


2. thrombocytopenia


3. hypokalemia


4. hypomagnesemia


5. supra therapeutic INR 3.3


6. diarrhea


7. generalized weakness





P:


1. normocytic anemia s/p 2 units PRBCs. Ordered stool occult test. iron 

studies. will continue to monitor.


2. thrombocytopenia- ordered 1 unit platelets to be transfused tomorrow.


3. hypokalemia- replaced with KCl 40 mEq Q4H x 2. Will recheck this afternoon.


4. hypomagnesemia- replaced with MgS 2 g IV once. Recheck tomorrow.


5. supra-therapeutic INR, will hold warfarin today. Recheck INR tomorrow.


6. Diarrrhea- ordered stool culture and C.diff.





Dispo 1-2 days to Rd.

## 2018-10-08 LAB
BILIRUB INDIRECT SERPL-MCNC: 0.31 MG/DL
CHLORIDE SERPL-SCNC: 112 MMOL/L (ref 98–107)
SODIUM SERPL-SCNC: 144 MMOL/L (ref 136–145)

## 2018-10-08 RX ADMIN — Medication SCH ML: at 13:00

## 2018-10-08 RX ADMIN — Medication SCH ML: at 17:48

## 2018-10-08 RX ADMIN — Medication SCH ML: at 06:33

## 2018-10-08 NOTE — PCM.PN
- General Info


Date of Service: 10/08/18


Subjective Update: 





Doing well this morning. denies nausea, vomiting. Endorses some diffuse 

abdominal pain. States her BMs have decreased in frequency. Denies chest pain, 

dyspnea.





- Patient Data


Vitals - Most Recent: 


 Last Vital Signs











Temp  36.9 C   10/08/18 04:00


 


Pulse  100   10/08/18 04:00


 


Resp  20   10/08/18 04:00


 


BP  119/77   10/08/18 04:00


 


Pulse Ox  93 L  10/07/18 23:59











Weight - Most Recent: 74.843 kg


I&O - Last 24 Hours: 


 Intake & Output











 10/07/18 10/08/18 10/08/18





 22:59 06:59 14:59


 


Intake Total 500 250 50


 


Balance 500 250 50











Lab Results Last 24 Hours: 


 Laboratory Results - last 24 hr











  10/06/18 10/07/18 10/07/18 Range/Units





  06:24 06:07 11:49 


 


WBC     (4.0-11.0)  K/uL


 


RBC     (4.30-5.90)  M/uL


 


Hgb     (12.0-16.0)  g/dL


 


Hct     (36.0-46.0)  %


 


MCV     (80.0-98.0)  fL


 


MCH     (27.0-32.0)  pg


 


MCHC     (31.0-37.0)  g/dL


 


RDW Std Deviation     (28.0-62.0)  fl


 


RDW Coeff of Castillo     (11.0-15.0)  %


 


Plt Count     (150-400)  K/uL


 


MPV     (7.40-12.00)  fL


 


Add Manual Diff     


 


Nucleated RBC %     /100WBC


 


Nucleated RBCs #     K/uL


 


INR     


 


Sodium     (136-145)  mmol/L


 


Potassium     (3.5-5.1)  mmol/L


 


Chloride     ()  mmol/L


 


Carbon Dioxide     (21.0-32.0)  mmol/L


 


BUN     (7.0-18.0)  mg/dL


 


Creatinine     (0.6-1.0)  mg/dL


 


Est Cr Clr Drug Dosing     mL/min


 


Estimated GFR (MDRD)     ml/min


 


Glucose     ()  mg/dL


 


POC Glucose    129 H  ()  mg/dL


 


Calcium     (8.5-10.1)  mg/dL


 


Magnesium     (1.8-2.4)  mg/dL


 


Iron   24 L   ()  ug/dL


 


TIBC   145 L   (250-450)  ug/dL


 


% Saturation   16.55 L   (20-55)  %


 


Ferritin   717 H   (8-252)  ng/mL


 


Total Bilirubin     (0.2-1.0)  mg/dL


 


Direct Bilirubin     (0.0-0.5)  mg/dL


 


Indirect Bilirubin     


 


AST     (15-37)  IU/L


 


ALT     (14-63)  IU/L


 


Alkaline Phosphatase     ()  U/L


 


Lactate Dehydrogenase     ()  U/L


 


Total Protein     (6.4-8.2)  g/dL


 


Albumin     (3.4-5.0)  g/dL


 


Globulin     (2.0-3.5)  g/dL


 


Albumin/Globulin Ratio     (1.3-2.8)  


 


Blood Type  Cancelled    


 


Antibody Screen  Cancelled    


 


Crossmatch  See Detail    














  10/07/18 10/07/18 10/08/18 Range/Units





  16:08 16:54 05:00 


 


WBC     (4.0-11.0)  K/uL


 


RBC     (4.30-5.90)  M/uL


 


Hgb     (12.0-16.0)  g/dL


 


Hct     (36.0-46.0)  %


 


MCV     (80.0-98.0)  fL


 


MCH     (27.0-32.0)  pg


 


MCHC     (31.0-37.0)  g/dL


 


RDW Std Deviation     (28.0-62.0)  fl


 


RDW Coeff of Castillo     (11.0-15.0)  %


 


Plt Count     (150-400)  K/uL


 


MPV     (7.40-12.00)  fL


 


Add Manual Diff     


 


Nucleated RBC %     /100WBC


 


Nucleated RBCs #     K/uL


 


INR     


 


Sodium   142   (136-145)  mmol/L


 


Potassium   3.4 L   (3.5-5.1)  mmol/L


 


Chloride   110 H   ()  mmol/L


 


Carbon Dioxide   20.8 L   (21.0-32.0)  mmol/L


 


BUN   14   (7.0-18.0)  mg/dL


 


Creatinine   0.9   (0.6-1.0)  mg/dL


 


Est Cr Clr Drug Dosing   53.81   mL/min


 


Estimated GFR (MDRD)   > 60.0   ml/min


 


Glucose   151 H   ()  mg/dL


 


POC Glucose  149 H    ()  mg/dL


 


Calcium   8.3 L   (8.5-10.1)  mg/dL


 


Magnesium     (1.8-2.4)  mg/dL


 


Iron     ()  ug/dL


 


TIBC     (250-450)  ug/dL


 


% Saturation     (20-55)  %


 


Ferritin     (8-252)  ng/mL


 


Total Bilirubin    0.6  (0.2-1.0)  mg/dL


 


Direct Bilirubin    0.29  (0.0-0.5)  mg/dL


 


Indirect Bilirubin    0.31  


 


AST    7 L  (15-37)  IU/L


 


ALT    22  (14-63)  IU/L


 


Alkaline Phosphatase    68  ()  U/L


 


Lactate Dehydrogenase    154  ()  U/L


 


Total Protein    4.9 L  (6.4-8.2)  g/dL


 


Albumin    1.5 L  (3.4-5.0)  g/dL


 


Globulin    3.4  (2.0-3.5)  g/dL


 


Albumin/Globulin Ratio    0.4 L  (1.3-2.8)  


 


Blood Type     


 


Antibody Screen     


 


Crossmatch     














  10/08/18 10/08/18 10/08/18 Range/Units





  05:26 05:28 05:28 


 


WBC   1.22 L   (4.0-11.0)  K/uL


 


RBC   2.11 L   (4.30-5.90)  M/uL


 


Hgb   6.0 L   (12.0-16.0)  g/dL


 


Hct   17.8 L   (36.0-46.0)  %


 


MCV   84.4   (80.0-98.0)  fL


 


MCH   28.4   (27.0-32.0)  pg


 


MCHC   33.7   (31.0-37.0)  g/dL


 


RDW Std Deviation   62.9 H   (28.0-62.0)  fl


 


RDW Coeff of Castillo   20 H   (11.0-15.0)  %


 


Plt Count   28 L   (150-400)  K/uL


 


MPV   9.80   (7.40-12.00)  fL


 


Add Manual Diff   YES   


 


Nucleated RBC %   0.0   /100WBC


 


Nucleated RBCs #   0   K/uL


 


INR     


 


Sodium    144  (136-145)  mmol/L


 


Potassium    4.1  (3.5-5.1)  mmol/L


 


Chloride    112 H  ()  mmol/L


 


Carbon Dioxide    22.7  (21.0-32.0)  mmol/L


 


BUN    15  (7.0-18.0)  mg/dL


 


Creatinine    0.8  (0.6-1.0)  mg/dL


 


Est Cr Clr Drug Dosing    60.54  mL/min


 


Estimated GFR (MDRD)    > 60.0  ml/min


 


Glucose    153 H  ()  mg/dL


 


POC Glucose  177 H    ()  mg/dL


 


Calcium    8.7  (8.5-10.1)  mg/dL


 


Magnesium    1.7 L  (1.8-2.4)  mg/dL


 


Iron     ()  ug/dL


 


TIBC     (250-450)  ug/dL


 


% Saturation     (20-55)  %


 


Ferritin     (8-252)  ng/mL


 


Total Bilirubin     (0.2-1.0)  mg/dL


 


Direct Bilirubin     (0.0-0.5)  mg/dL


 


Indirect Bilirubin     


 


AST     (15-37)  IU/L


 


ALT     (14-63)  IU/L


 


Alkaline Phosphatase     ()  U/L


 


Lactate Dehydrogenase     ()  U/L


 


Total Protein     (6.4-8.2)  g/dL


 


Albumin     (3.4-5.0)  g/dL


 


Globulin     (2.0-3.5)  g/dL


 


Albumin/Globulin Ratio     (1.3-2.8)  


 


Blood Type     


 


Antibody Screen     


 


Crossmatch     














  10/08/18 Range/Units





  05:28 


 


WBC   (4.0-11.0)  K/uL


 


RBC   (4.30-5.90)  M/uL


 


Hgb   (12.0-16.0)  g/dL


 


Hct   (36.0-46.0)  %


 


MCV   (80.0-98.0)  fL


 


MCH   (27.0-32.0)  pg


 


MCHC   (31.0-37.0)  g/dL


 


RDW Std Deviation   (28.0-62.0)  fl


 


RDW Coeff of Castillo   (11.0-15.0)  %


 


Plt Count   (150-400)  K/uL


 


MPV   (7.40-12.00)  fL


 


Add Manual Diff   


 


Nucleated RBC %   /100WBC


 


Nucleated RBCs #   K/uL


 


INR  5.80  


 


Sodium   (136-145)  mmol/L


 


Potassium   (3.5-5.1)  mmol/L


 


Chloride   ()  mmol/L


 


Carbon Dioxide   (21.0-32.0)  mmol/L


 


BUN   (7.0-18.0)  mg/dL


 


Creatinine   (0.6-1.0)  mg/dL


 


Est Cr Clr Drug Dosing   mL/min


 


Estimated GFR (MDRD)   ml/min


 


Glucose   ()  mg/dL


 


POC Glucose   ()  mg/dL


 


Calcium   (8.5-10.1)  mg/dL


 


Magnesium   (1.8-2.4)  mg/dL


 


Iron   ()  ug/dL


 


TIBC   (250-450)  ug/dL


 


% Saturation   (20-55)  %


 


Ferritin   (8-252)  ng/mL


 


Total Bilirubin   (0.2-1.0)  mg/dL


 


Direct Bilirubin   (0.0-0.5)  mg/dL


 


Indirect Bilirubin   


 


AST   (15-37)  IU/L


 


ALT   (14-63)  IU/L


 


Alkaline Phosphatase   ()  U/L


 


Lactate Dehydrogenase   ()  U/L


 


Total Protein   (6.4-8.2)  g/dL


 


Albumin   (3.4-5.0)  g/dL


 


Globulin   (2.0-3.5)  g/dL


 


Albumin/Globulin Ratio   (1.3-2.8)  


 


Blood Type   


 


Antibody Screen   


 


Crossmatch   











Pablo Results Last 24 Hours: 


 Microbiology











 10/07/18 14:15 Campylobacter Antigen Assay - Final





 Stool / Feces    NEGATIVE CAMPYLOBACTER AG





 Shiga Toxin I - Final





    NEGATIVE FOR SHIGA TOXIN 1





 Shiga Toxin II - Final





    NEGATIVE FOR SHIGA TOXIN 2


 


 10/07/18 14:15 Clostridium difficile Toxin A & B - Final





 Stool / Feces    Positive C. Diff Antigen





 Stool Occult Blood (PABLO) - Final





    NEGATIVE OCCULT BLOOD











Med Orders - Current: 


 Current Medications





Acetaminophen (Tylenol)  650 mg PO Q4H PRN


   PRN Reason: Pain (Mild 1-3)/fever


   Last Admin: 10/07/18 20:22 Dose:  650 mg


Albuterol (Ventolin Hfa)  0 gm INH Q6H PRN


   PRN Reason: Shortness of Breath


   Last Admin: 10/08/18 09:26 Dose:  1 spray


Benzonatate (Tessalon Perles)  200 mg PO BID PRN


   PRN Reason: Cough


Docusate Sodium (Colace)  100 mg PO BID PRN


   PRN Reason: Constipation


Guaifenesin (Mucinex)  600 mg PO BID [facility]


   Last Admin: 10/08/18 09:39 Dose:  600 mg


Insulin Aspart (Novolog)  0 unit SUBCUT TIDAC [facility]; Protocol


   Last Admin: 10/08/18 07:29 Dose:  1 units


Levothyroxine Sodium (Synthroid)  100 mcg PO ACBREAKFAST formerly [facility]


   Last Admin: 10/08/18 06:33 Dose:  100 mcg


Ondansetron HCl (Zofran Odt)  4 mg PO Q4H PRN


   PRN Reason: nausea, able to take PO


Ondansetron HCl (Zofran)  4 mg IVPUSH Q4H PRN


   PRN Reason: Nausea


Oxycodone HCl (Oxycodone)  5 mg PO Q4H PRN


   PRN Reason: Pain (moderate 4-6)


Pantoprazole Sodium (Protonix***)  40 mg PO ACBREAKFAST formerly [facility]


   Last Admin: 10/08/18 06:33 Dose:  40 mg


Polyethylene Glycol (Miralax)  17 gm PO DAILY PRN


   PRN Reason: Constipation


   Last Admin: 10/04/18 19:43 Dose:  17 gm


Prednisone (Prednisone)  5 mg PO DAILY formerly [facility]


   Last Admin: 10/08/18 09:39 Dose:  5 mg


Temazepam (Restoril)  15 mg PO BEDTIME PRN


   PRN Reason: Sleep


Vancomycin HCl (First-Vancomycin 25 Compounding Kit)  125 mg PO QID formerly [facility]


   Last Admin: 10/08/18 06:33 Dose:  5 ml


Wound Care/Dressing Products (Duoderm Cgf)  1 each TOP ASDIRECTED PRN


   PRN Reason: bedsore


   Last Admin: 10/08/18 06:38 Dose:  1 each





Discontinued Medications





Albuterol (Proventil Hfa)  0 gm INH Q6H PRN


   PRN Reason: Shortness of Breath


Albuterol/Ipratropium (Duoneb 3.0-0.5 Mg/3 Ml)  3 ml NEB ONETIME ONE


   Stop: 10/04/18 11:44


   Last Admin: 10/04/18 11:54 Dose:  3 ml


Carvedilol (Coreg)  6.25 mg PO BID formerly [facility]


   Last Admin: 10/05/18 20:27 Dose:  6.25 mg


Sodium Chloride (Normal Saline)  500 mls @ 999 mls/hr IV .BOLUS ONE


   Stop: 10/04/18 11:43


   Last Infusion: 10/04/18 11:45 Dose:  125 mls/hr


Sodium Chloride (Normal Saline)  1,000 mls @ 100 mls/hr IV ASDIRECTED formerly [facility]


   Last Infusion: 10/06/18 04:19 Dose:  100 mls/hr


Magnesium Sulfate 2 gm/ Premix  50 mls @ 25 mls/hr IV ONETIME ONE


   Stop: 10/05/18 08:51


   Last Admin: 10/05/18 08:28 Dose:  25 mls/hr


Potassium Chloride/Sodium Chloride (Normal Saline With 20 Meq Kcl)  1,000 mls @ 

100 mls/hr IV ASDIRECTED formerly [facility]


   Last Admin: 10/07/18 04:14 Dose:  100 mls/hr


Magnesium Sulfate 2 gm/ Premix  50 mls @ 25 mls/hr IV ONETIME ONE


   Stop: 10/07/18 10:36


   Last Admin: 10/07/18 10:04 Dose:  25 mls/hr


Magnesium Sulfate 2 gm/ Premix  50 mls @ 50 mls/hr IV ONETIME ONE


   Stop: 10/08/18 08:17


   Last Admin: 10/08/18 08:14 Dose:  50 mls/hr


Ibuprofen (Motrin)  600 mg PO Q6H PRN


   PRN Reason: Pain (mild 1-3)


Omeprazole (Omeprazole)  20 mg PO ACBREAKFAST formerly [facility]


   Last Admin: 10/06/18 06:33 Dose:  20 mg


Potassium Chloride (Klor-Con M20)  40 meq PO DAILY formerly [facility]


Potassium Chloride (Klor-Con M20)  40 meq PO DAILY ONE


   Stop: 10/06/18 08:05


   Last Admin: 10/06/18 09:38 Dose:  40 meq


Potassium Chloride (Potassium Chloride)  40 meq PO Q4H formerly [facility]


   Stop: 10/07/18 11:46


   Last Admin: 10/07/18 12:10 Dose:  40 meq


Potassium Chloride (Potassium Chloride)  40 meq PO ONETIME ONE


   Stop: 10/07/18 17:27


   Last Admin: 10/07/18 18:15 Dose:  40 meq


Warfarin Sodium (Coumadin)  7.5 mg PO TuThSa@1400 formerly [facility]


   Last Admin: 10/06/18 15:02 Dose:  7.5 mg


Warfarin Sodium (Coumadin)  5 mg PO SuMoWeFr@1400 formerly [facility]


   Last Admin: 10/05/18 14:34 Dose:  5 mg











- Exam


General: Alert, Oriented


Lungs: Rales (rales bilaterally in lower lung fields.)


Cardiovascular: Regular Rate


GI/Abdominal Exam: Normal Bowel Sounds (distended, tender in lower quadrants. 

No rebound.)


Extremities: Normal Inspection, Non-Tender, No Pedal Edema


Skin: Warm, Dry





- Problem List Review


Problem List Initiated/Reviewed/Updated: Yes





- My Orders


Last 24 Hours: 


My Active Orders





10/07/18 09:58


PLATELETS APH [BBK] Routine 





10/07/18 14:15


CULTURE STOOL + CAMPY+SHIGATOX [RM] Routine 





10/07/18 14:19


Hydrocolloid Dressing [DuoDerm CGF]   1 each TOP ASDIRECTED PRN 





10/07/18 17:45


guaiFENesin [Mucinex]   600 mg PO BID 





10/07/18 18:00


Vancomycin [First-Vancomycin 25 Compounding Kit]   125 mg PO QID 





10/08/18 05:28


CBC WITH AUTO DIFF [HEME] AM 





10/08/18 08:07


Transfuse Red Blood Cells [COMM] Routine 





10/08/18 08:08


RED BLOOD CELLS LP [BBK] Routine 





10/08/18 08:17


Supplement (Dietary) [Dietary Supplements] [RC] TIDMEALS 














- Plan


Plan:: 





A:


1. normocytic anemia s/p transfusion 2 units PRBCs


2. C.diff colitis


3. thrombocytopenia


4. hypokalemia, resolved


5. hypomagnesemia, replaced


6. supra-therapeutic INR 5.8


7. generalized weakness


8. severe protein malnutrition





P:


1. normocytic anemia. Pt remains severely anemic at Hg 6.0. Stool occult was 

negative. Suspect this is due to her chemotherapy. I have ordered 2 units of 

irradiated PRBCs to be transfused today.


2. thrombocytopenia- transfuse 1 unit platelets today.


3. C. diff colitis- continue vancomycin 125 mg PO QID for total of 10 days. 

Will start maintenance NS fluids today.


4. hypomagnesemia- replaced with MgS 2 g IV once. Recheck tomorrow.


5. supra-therapeutic INR, will hold warfarin today. Recheck INR tomorrow.


6. Severe protein malnutrition- ordered Ensure TID with meals.





Dispo plan to [place] to [facility] tomorrow.

## 2018-10-09 VITALS — SYSTOLIC BLOOD PRESSURE: 122 MMHG | DIASTOLIC BLOOD PRESSURE: 77 MMHG

## 2018-10-09 LAB
CHLORIDE SERPL-SCNC: 110 MMOL/L (ref 98–107)
SODIUM SERPL-SCNC: 146 MMOL/L (ref 136–145)

## 2018-10-09 RX ADMIN — Medication SCH ML: at 11:44

## 2018-10-09 RX ADMIN — Medication SCH ML: at 00:17

## 2018-10-09 RX ADMIN — Medication SCH ML: at 06:24

## 2018-10-09 NOTE — [id number]
<Toribio Solano - Last Filed: 10/09/18 10:40>





**Discharge Summary





- Hospital Course


Free Text/Narrative:: 





Admission date: 10/4/18





Discharge date: 10/9/18





Admission diagnosis:


1. Generalized weakness


2. Pancytopenia


3. Elevated troponin


4. Hyperglycemia


5. Chronic kidney disease, stage III


6. hypothyroidism


7. PMH atrial fibrillation and pulmonary embolism, small cell carcinoma of the 

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





Discharge diagnosis:


1. Generalized weakness, improved


2. Normocytic anemia anemia s/p transfusion total of 4 units PRBCs


3. C.diff colitis, improving


4. Elevated troponin, negative serial troponins


5. Supra-therapeutic INR 4.03, holdwing warfarin


6. thrombocytopenia, s/p transfusion 1 unit platelets


7. hypokalemia, replaced


8. hypomagnesemia, replaced


9. Chronic kidney disease, stage III


10. Hypothyroidism, will need to recheck 4 weeks after discharge


11. Severe protein malnutrition


12. 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 

Afib, prior pulmonary embolism





Procedures: none





Consults: none





Hospital course:


Marielena Subramanian is a 62 y/o female with history of small cell carcinoma of the lung 

with metastases to brain and kidney, currently on chemotherapy and Afib and 

prior pulmonary embolism who presented to the ER complaining of generalized 

weakness and shortness of breath.  She was admitted for generalized weakness. 

Serial troponins were negative. She was also noted to be pancytopenic with Hg 

level of 6.4 secondary to her chemotherapy. She initially was transfused with 2 

units PRBCs and Hg increased to 9.6, however, days later it went down to Hg 

6.0. She was subsequently transfused with another 2 units PRBCs. Her Hg level 

increased to 12.1 at time of discharge. Stool occult screen was negative.  

During this hospitalization, the patient developed C. diff colitis. She was 

started on Vancomycin 125 mg PO QID for to be treated for total of 10 days.  

Her warfarin was held due to INR being supra-therapeutic at INR 4.03 at time of 

discharge. She will need daily INR checks and adjust warfarin dose accordingly. 

Her TSH was elevated at 16. She will need her TSH rechecked in approximately 4 

weeks and reassess synthroid dose.





Follow-up:


1. Follow-up with Dr. Lynn in 1-2 weeks.





- Discharge Data


Discharge Date: 10/09/18


Discharge Disposition: DC/Tfer to [facility] 03


Condition: Stable





- Patient Instructions


Diet: Regular Diet as Tolerated


Diet, Other: Neutropenic diet


Activity: As Tolerated


Notify Provider of: Fever, Increased Pain, Swelling and Redness, Drainage, 

Nausea and/or Vomiting


Other/Special Instructions: -contact precautions C.diff.  -neutropenic diet.  -

PT/OT, speech therapy.  -Check INR daily.  -Oxygen PRN for O2 sat<90%





- Discharge Plan


*PRESCRIPTION DRUG MONITORING PROGRAM REVIEWED*: Not Applicable


*COPY OF PRESCRIPTION DRUG MONITORING REPORT IN PATIENT MARIA L: Not Applicable


Prescriptions/Med Rec: 


guaiFENesin [Mucinex] 600 mg PO BID PRN 30 Days #60 tab.er


 PRN Reason: Cough


Potassium Chloride 20 meq PO DAILY 7 Days #7 tablet.er


Vancomycin [First-Vancomycin 25 Compounding Kit] 125 mg PO QID 9 Days #1 bottle


Home Medications: 


 Home Meds





Carvedilol 6.25 mg PO BID 01/09/17 [History]


Levothyroxine Sodium 100 mcg PO DAILY 07/06/17 [History]


Magnesium Oxide 800 mg PO TID 07/06/17 [History]


Omeprazole 20 mg PO ACBREAKFAST 07/06/17 [History]


Multivitamins [Childrens Chewable Vitamin] 0 mg PO DAILY 02/27/18 [History]


Warfarin [Coumadin] 7.5 mg PO TUTHSA@1800 02/27/18 [History]


Albuterol Sulfate [Proair Hfa] 1 - 2 puff INH ASDIRECTED 10/04/18 [History]


Warfarin [Coumadin] 5 mg PO SUMOWEFR@1800 10/04/18 [History]


Potassium Chloride 20 meq PO DAILY 7 Days #7 tablet.er 10/09/18 [Rx]


Vancomycin [First-Vancomycin 25 Compounding Kit] 125 mg PO QID 9 Days #1 bottle 

10/09/18 [Rx]


guaiFENesin [Mucinex] 600 mg PO BID PRN 30 Days #60 tab.er 10/09/18 [Rx]








Patient Handouts:  Guaifenesin oral solution and syrup, Vancomycin oral solution

, Potassium phosphate; Sodium Phosphate oral tablet


Referrals: 


[facility] [Outside]


Lg Centeno MD [Primary Care Provider] - 10/15/18





- Discharge Summary/Plan Comment


DC Time >30 min.: No





- Patient Data


Vitals - Most Recent: 


 Last Vital Signs











Temp  36.8 C   10/09/18 08:00


 


Pulse  82   10/09/18 04:00


 


Resp  24 H  10/09/18 08:00


 


BP  121/83   10/09/18 08:00


 


Pulse Ox  97   10/09/18 08:00











Weight - Most Recent: 74.843 kg


I&O - Last 24 hours: 


 Intake & Output











 10/08/18 10/09/18 10/09/18





 22:59 06:59 14:59


 


Intake Total 1291 100 


 


Output Total  195 


 


Balance 1291 -95 











Lab Results - Last 24 hrs: 


 Laboratory Results - last 24 hr











  10/06/18 10/06/18 10/08/18 Range/Units





  06:24 06:24 05:28 


 


WBC     (4.0-11.0)  K/uL


 


RBC     (4.30-5.90)  M/uL


 


Hgb     (12.0-16.0)  g/dL


 


Hct     (36.0-46.0)  %


 


MCV     (80.0-98.0)  fL


 


MCH     (27.0-32.0)  pg


 


MCHC     (31.0-37.0)  g/dL


 


RDW Std Deviation     (28.0-62.0)  fl


 


RDW Coeff of Castillo     (11.0-15.0)  %


 


Plt Count     (150-400)  K/uL


 


MPV     (7.40-12.00)  fL


 


Add Manual Diff     


 


Neutrophils % (Manual)    45 L  (48.0-80.0)  %


 


Band Neutrophils %    3  %


 


Lymphocytes % (Manual)    41 H  (16.0-40.0)  %


 


Monocytes % (Manual)    10  (0.0-15.0)  %


 


Eosinophils % (Manual)     (0.0-7.0)  %


 


Metamyelocytes %    1  %


 


Nucleated RBC %     /100WBC


 


Absolute Seg Neuts    0.5 L  (1.4-5.7)  


 


Band Neutrophils #    0  


 


Lymphocytes # (Manual)    0.5 L  (0.6-2.4)  


 


Monocytes # (Manual)    0.1  (0.0-0.8)  


 


Eosinophils # (Manual)     (0.0-0.7)  


 


Absolute Metamyelocyte    0  


 


Nucleated RBCs    1  %


 


Nucleated RBCs #     K/uL


 


INR     


 


Sodium     (136-145)  mmol/L


 


Potassium     (3.5-5.1)  mmol/L


 


Chloride     ()  mmol/L


 


Carbon Dioxide     (21.0-32.0)  mmol/L


 


BUN     (7.0-18.0)  mg/dL


 


Creatinine     (0.6-1.0)  mg/dL


 


Est Cr Clr Drug Dosing     mL/min


 


Estimated GFR (MDRD)     ml/min


 


Glucose     ()  mg/dL


 


POC Glucose     ()  mg/dL


 


Calcium     (8.5-10.1)  mg/dL


 


Magnesium     (1.8-2.4)  mg/dL


 


Blood Type  A POSITIVE  Cancelled   


 


Antibody Screen  NEGATIVE  Cancelled   


 


Crossmatch  See Detail  See Detail   














  10/08/18 10/08/18 10/08/18 Range/Units





  11:35 16:40 19:40 


 


WBC    1.83 L  (4.0-11.0)  K/uL


 


RBC    4.30  (4.30-5.90)  M/uL


 


Hgb    12.4  (12.0-16.0)  g/dL


 


Hct    36.0  (36.0-46.0)  %


 


MCV    83.7  (80.0-98.0)  fL


 


MCH    28.8  (27.0-32.0)  pg


 


MCHC    34.4  (31.0-37.0)  g/dL


 


RDW Std Deviation    57.9  (28.0-62.0)  fl


 


RDW Coeff of Castillo    19 H  (11.0-15.0)  %


 


Plt Count    53 L  (150-400)  K/uL


 


MPV    9.30  (7.40-12.00)  fL


 


Add Manual Diff    YES  


 


Neutrophils % (Manual)    45 L  (48.0-80.0)  %


 


Band Neutrophils %    11  %


 


Lymphocytes % (Manual)    24  (16.0-40.0)  %


 


Monocytes % (Manual)    19 H  (0.0-15.0)  %


 


Eosinophils % (Manual)    1  (0.0-7.0)  %


 


Metamyelocytes %     %


 


Nucleated RBC %    1.4  /100WBC


 


Absolute Seg Neuts    0.8 L  (1.4-5.7)  


 


Band Neutrophils #    0.2  


 


Lymphocytes # (Manual)    0.4 L  (0.6-2.4)  


 


Monocytes # (Manual)    0.3  (0.0-0.8)  


 


Eosinophils # (Manual)    0.0  (0.0-0.7)  


 


Absolute Metamyelocyte     


 


Nucleated RBCs     %


 


Nucleated RBCs #    0  K/uL


 


INR     


 


Sodium     (136-145)  mmol/L


 


Potassium     (3.5-5.1)  mmol/L


 


Chloride     ()  mmol/L


 


Carbon Dioxide     (21.0-32.0)  mmol/L


 


BUN     (7.0-18.0)  mg/dL


 


Creatinine     (0.6-1.0)  mg/dL


 


Est Cr Clr Drug Dosing     mL/min


 


Estimated GFR (MDRD)     ml/min


 


Glucose     ()  mg/dL


 


POC Glucose  122 H  157 H   ()  mg/dL


 


Calcium     (8.5-10.1)  mg/dL


 


Magnesium     (1.8-2.4)  mg/dL


 


Blood Type     


 


Antibody Screen     


 


Crossmatch     














  10/09/18 10/09/18 10/09/18 Range/Units





  04:55 04:55 04:55 


 


WBC  2.37 L    (4.0-11.0)  K/uL


 


RBC  4.25 L    (4.30-5.90)  M/uL


 


Hgb  12.1    (12.0-16.0)  g/dL


 


Hct  35.8 L    (36.0-46.0)  %


 


MCV  84.2    (80.0-98.0)  fL


 


MCH  28.5    (27.0-32.0)  pg


 


MCHC  33.8    (31.0-37.0)  g/dL


 


RDW Std Deviation  59.4    (28.0-62.0)  fl


 


RDW Coeff of Castillo  19 H    (11.0-15.0)  %


 


Plt Count  46 L    (150-400)  K/uL


 


MPV  9.80    (7.40-12.00)  fL


 


Add Manual Diff  YES    


 


Neutrophils % (Manual)  19 L    (48.0-80.0)  %


 


Band Neutrophils %  36    %


 


Lymphocytes % (Manual)  29    (16.0-40.0)  %


 


Monocytes % (Manual)  16 H    (0.0-15.0)  %


 


Eosinophils % (Manual)     (0.0-7.0)  %


 


Metamyelocytes %     %


 


Nucleated RBC %  1.5    /100WBC


 


Absolute Seg Neuts  0.5 L    (1.4-5.7)  


 


Band Neutrophils #  0.9    


 


Lymphocytes # (Manual)  0.7    (0.6-2.4)  


 


Monocytes # (Manual)  0.4    (0.0-0.8)  


 


Eosinophils # (Manual)     (0.0-0.7)  


 


Absolute Metamyelocyte     


 


Nucleated RBCs     %


 


Nucleated RBCs #  0    K/uL


 


INR    4.03  


 


Sodium   146 H   (136-145)  mmol/L


 


Potassium   3.0 L   (3.5-5.1)  mmol/L


 


Chloride   110 H   ()  mmol/L


 


Carbon Dioxide   27.6   (21.0-32.0)  mmol/L


 


BUN   18   (7.0-18.0)  mg/dL


 


Creatinine   1.0   (0.6-1.0)  mg/dL


 


Est Cr Clr Drug Dosing   48.43   mL/min


 


Estimated GFR (MDRD)   55.6   ml/min


 


Glucose   121 H   ()  mg/dL


 


POC Glucose     ()  mg/dL


 


Calcium   8.8   (8.5-10.1)  mg/dL


 


Magnesium   1.6 L   (1.8-2.4)  mg/dL


 


Blood Type     


 


Antibody Screen     


 


Crossmatch     














  10/09/18 Range/Units





  06:25 


 


WBC   (4.0-11.0)  K/uL


 


RBC   (4.30-5.90)  M/uL


 


Hgb   (12.0-16.0)  g/dL


 


Hct   (36.0-46.0)  %


 


MCV   (80.0-98.0)  fL


 


MCH   (27.0-32.0)  pg


 


MCHC   (31.0-37.0)  g/dL


 


RDW Std Deviation   (28.0-62.0)  fl


 


RDW Coeff of Castillo   (11.0-15.0)  %


 


Plt Count   (150-400)  K/uL


 


MPV   (7.40-12.00)  fL


 


Add Manual Diff   


 


Neutrophils % (Manual)   (48.0-80.0)  %


 


Band Neutrophils %   %


 


Lymphocytes % (Manual)   (16.0-40.0)  %


 


Monocytes % (Manual)   (0.0-15.0)  %


 


Eosinophils % (Manual)   (0.0-7.0)  %


 


Metamyelocytes %   %


 


Nucleated RBC %   /100WBC


 


Absolute Seg Neuts   (1.4-5.7)  


 


Band Neutrophils #   


 


Lymphocytes # (Manual)   (0.6-2.4)  


 


Monocytes # (Manual)   (0.0-0.8)  


 


Eosinophils # (Manual)   (0.0-0.7)  


 


Absolute Metamyelocyte   


 


Nucleated RBCs   %


 


Nucleated RBCs #   K/uL


 


INR   


 


Sodium   (136-145)  mmol/L


 


Potassium   (3.5-5.1)  mmol/L


 


Chloride   ()  mmol/L


 


Carbon Dioxide   (21.0-32.0)  mmol/L


 


BUN   (7.0-18.0)  mg/dL


 


Creatinine   (0.6-1.0)  mg/dL


 


Est Cr Clr Drug Dosing   mL/min


 


Estimated GFR (MDRD)   ml/min


 


Glucose   ()  mg/dL


 


POC Glucose  121 H  ()  mg/dL


 


Calcium   (8.5-10.1)  mg/dL


 


Magnesium   (1.8-2.4)  mg/dL


 


Blood Type   


 


Antibody Screen   


 


Crossmatch   











HUMA Results - Last 24 hrs: 


 Microbiology











 10/07/18 14:15 Stool Culture - Final





 Stool / Feces    NO SALMONELLA, SHIGELLA,OR E.COLI O157 ISOLATED





 Campylobacter Antigen Assay - Final





    NEGATIVE CAMPYLOBACTER AG





 Shiga Toxin I - Final





    NEGATIVE FOR SHIGA TOXIN 1





 Shiga Toxin II - Final





    NEGATIVE FOR SHIGA TOXIN 2











Med Orders - Current: 


 Current Medications





Acetaminophen (Tylenol)  650 mg PO Q4H PRN


   PRN Reason: Pain (Mild 1-3)/fever


   Last Admin: 10/08/18 19:02 Dose:  650 mg


Albuterol (Ventolin Hfa)  0 gm INH Q6H PRN


   PRN Reason: Shortness of Breath


   Last Admin: 10/08/18 09:26 Dose:  1 spray


Artificial Tears (Refresh Plus 0.5%)  1 each EYEBOTH BID PRN


   PRN Reason: Eye Dryness


   Last Admin: 10/08/18 17:48 Dose:  2 drop


Benzonatate (Tessalon Perles)  200 mg PO BID PRN


   PRN Reason: Cough


Docusate Sodium (Colace)  100 mg PO BID PRN


   PRN Reason: Constipation


Guaifenesin (Mucinex)  600 mg PO BID [facility]


   Last Admin: 10/09/18 08:54 Dose:  600 mg


Sodium Chloride (Normal Saline)  1,000 mls @ 100 mls/hr IV ASDIRECTED [facility]


Insulin Aspart (Novolog)  0 unit SUBCUT TIDAC [facility]; Protocol


   Last Admin: 10/09/18 06:43 Dose:  Not Given


Levothyroxine Sodium (Synthroid)  100 mcg PO ACBREAKFAST [facility]


   Last Admin: 10/09/18 06:36 Dose:  100 mcg


Ondansetron HCl (Zofran Odt)  4 mg PO Q4H PRN


   PRN Reason: nausea, able to take PO


Ondansetron HCl (Zofran)  4 mg IVPUSH Q4H PRN


   PRN Reason: Nausea


Oxycodone HCl (Oxycodone)  5 mg PO Q4H PRN


   PRN Reason: Pain (moderate 4-6)


Pantoprazole Sodium (Protonix***)  40 mg PO ACBREAKFAST [facility]


   Last Admin: 10/09/18 06:36 Dose:  40 mg


Polyethylene Glycol (Miralax)  17 gm PO DAILY PRN


   PRN Reason: Constipation


   Last Admin: 10/04/18 19:43 Dose:  17 gm


Potassium Chloride (Potassium Chloride)  40 meq PO ONETIME ONE


   Stop: 10/09/18 10:01


Prednisone (Prednisone)  5 mg PO DAILY [facility]


   Last Admin: 10/09/18 08:54 Dose:  5 mg


Temazepam (Restoril)  15 mg PO BEDTIME PRN


   PRN Reason: Sleep


Vancomycin HCl (First-Vancomycin 25 Compounding Kit)  125 mg PO QID [facility]


   Last Admin: 10/09/18 06:24 Dose:  5 ml


Wound Care/Dressing Products (Duoderm Cgf)  1 each TOP ASDIRECTED PRN


   PRN Reason: bedsore


   Last Admin: 10/08/18 06:38 Dose:  1 each





Discontinued Medications





Albuterol (Proventil Hfa)  0 gm INH Q6H PRN


   PRN Reason: Shortness of Breath


Albuterol/Ipratropium (Duoneb 3.0-0.5 Mg/3 Ml)  3 ml NEB ONETIME ONE


   Stop: 10/04/18 11:44


   Last Admin: 10/04/18 11:54 Dose:  3 ml


Carvedilol (Coreg)  6.25 mg PO BID [facility]


   Last Admin: 10/05/18 20:27 Dose:  6.25 mg


Furosemide (Lasix)  20 mg IVPUSH NOW ONE


   Stop: 10/08/18 19:25


   Last Admin: 10/08/18 19:59 Dose:  20 mg


Sodium Chloride (Normal Saline)  500 mls @ 999 mls/hr IV .BOLUS ONE


   Stop: 10/04/18 11:43


   Last Infusion: 10/04/18 11:45 Dose:  125 mls/hr


Sodium Chloride (Normal Saline)  1,000 mls @ 100 mls/hr IV ASDIRECTED [facility]


   Last Infusion: 10/06/18 04:19 Dose:  100 mls/hr


Magnesium Sulfate 2 gm/ Premix  50 mls @ 25 mls/hr IV ONETIME ONE


   Stop: 10/05/18 08:51


   Last Admin: 10/05/18 08:28 Dose:  25 mls/hr


Potassium Chloride/Sodium Chloride (Normal Saline With 20 Meq Kcl)  1,000 mls @ 

100 mls/hr IV ASDIRECTED [facility]


   Last Admin: 10/07/18 04:14 Dose:  100 mls/hr


Magnesium Sulfate 2 gm/ Premix  50 mls @ 25 mls/hr IV ONETIME ONE


   Stop: 10/07/18 10:36


   Last Admin: 10/07/18 10:04 Dose:  25 mls/hr


Magnesium Sulfate 2 gm/ Premix  50 mls @ 50 mls/hr IV ONETIME ONE


   Stop: 10/08/18 08:17


   Last Admin: 10/08/18 08:14 Dose:  50 mls/hr


Magnesium Sulfate 2 gm/ Premix  50 mls @ 25 mls/hr IV ONETIME ONE


   Stop: 10/09/18 09:14


   Last Admin: 10/09/18 07:49 Dose:  25 mls/hr


Ibuprofen (Motrin)  600 mg PO Q6H PRN


   PRN Reason: Pain (mild 1-3)


Omeprazole (Omeprazole)  20 mg PO ACBREAKFAST [facility]


   Last Admin: 10/06/18 06:33 Dose:  20 mg


Potassium Chloride (Klor-Con M20)  40 meq PO DAILY [facility]


Potassium Chloride (Klor-Con M20)  40 meq PO DAILY ONE


   Stop: 10/06/18 08:05


   Last Admin: 10/06/18 09:38 Dose:  40 meq


Potassium Chloride (Potassium Chloride)  40 meq PO Q4H [facility]


   Stop: 10/07/18 11:46


   Last Admin: 10/07/18 12:10 Dose:  40 meq


Potassium Chloride (Potassium Chloride)  40 meq PO ONETIME ONE


   Stop: 10/07/18 17:27


   Last Admin: 10/07/18 18:15 Dose:  40 meq


Potassium Chloride (Potassium Chloride)  40 meq PO ONETIME ONE


   Stop: 10/09/18 07:17


   Last Admin: 10/09/18 07:58 Dose:  40 meq


Warfarin Sodium (Coumadin)  7.5 mg PO TuThSa@1400 [facility]


   Last Admin: 10/06/18 15:02 Dose:  7.5 mg


Warfarin Sodium (Coumadin)  5 mg PO SuMoWeFr@1400 [facility]


   Last Admin: 10/05/18 14:34 Dose:  5 mg











<Julio Morgan - Last Filed: 10/10/18 15:03>





**Discharge Summary





- Discharge Summary/Plan Comment


DC Time >30 min.: No





- Patient Data


Vitals - Most Recent: 


 Last Vital Signs











Temp  37.3 C   10/09/18 11:33


 


Pulse  82   10/09/18 04:00


 


Resp  24 H  10/09/18 11:33


 


BP  122/77   10/09/18 11:33


 


Pulse Ox  90 L  10/09/18 11:33











Med Orders - Current: 


 Current Medications








Discontinued Medications





Acetaminophen (Tylenol)  650 mg PO Q4H PRN


   PRN Reason: Pain (Mild 1-3)/fever


   Last Admin: 10/08/18 19:02 Dose:  650 mg


Albuterol (Proventil Hfa)  0 gm INH Q6H PRN


   PRN Reason: Shortness of Breath


Albuterol (Ventolin Hfa)  0 gm INH Q6H PRN


   PRN Reason: Shortness of Breath


   Last Admin: 10/09/18 11:46 Dose:  2 spray


Albuterol/Ipratropium (Duoneb 3.0-0.5 Mg/3 Ml)  3 ml NEB ONETIME ONE


   Stop: 10/04/18 11:44


   Last Admin: 10/04/18 11:54 Dose:  3 ml


Artificial Tears (Refresh Plus 0.5%)  1 each EYEBOTH BID PRN


   PRN Reason: Eye Dryness


   Last Admin: 10/08/18 17:48 Dose:  2 drop


Benzonatate (Tessalon Perles)  200 mg PO BID PRN


   PRN Reason: Cough


Carvedilol (Coreg)  6.25 mg PO BID [facility]


   Last Admin: 10/05/18 20:27 Dose:  6.25 mg


Docusate Sodium (Colace)  100 mg PO BID PRN


   PRN Reason: Constipation


Furosemide (Lasix)  20 mg IVPUSH NOW ONE


   Stop: 10/08/18 19:25


   Last Admin: 10/08/18 19:59 Dose:  20 mg


Guaifenesin (Mucinex)  600 mg PO BID [facility]


   Last Admin: 10/09/18 08:54 Dose:  600 mg


Heparin Sodium (Porcine) (Heparin Lock Flush 100 Units/Ml)  500 units FLUSH 

ONETIME ONE


   Stop: 10/09/18 11:37


   Last Admin: 10/09/18 12:31 Dose:  500 units


Sodium Chloride (Normal Saline)  500 mls @ 999 mls/hr IV .BOLUS ONE


   Stop: 10/04/18 11:43


   Last Infusion: 10/04/18 11:45 Dose:  125 mls/hr


Sodium Chloride (Normal Saline)  1,000 mls @ 100 mls/hr IV ASDIRECTED [facility]


   Last Infusion: 10/06/18 04:19 Dose:  100 mls/hr


Magnesium Sulfate 2 gm/ Premix  50 mls @ 25 mls/hr IV ONETIME ONE


   Stop: 10/05/18 08:51


   Last Admin: 10/05/18 08:28 Dose:  25 mls/hr


Potassium Chloride/Sodium Chloride (Normal Saline With 20 Meq Kcl)  1,000 mls @ 

100 mls/hr IV ASDIRECTED [facility]


   Last Admin: 10/07/18 04:14 Dose:  100 mls/hr


Magnesium Sulfate 2 gm/ Premix  50 mls @ 25 mls/hr IV ONETIME ONE


   Stop: 10/07/18 10:36


   Last Admin: 10/07/18 10:04 Dose:  25 mls/hr


Magnesium Sulfate 2 gm/ Premix  50 mls @ 50 mls/hr IV ONETIME ONE


   Stop: 10/08/18 08:17


   Last Admin: 10/08/18 08:14 Dose:  50 mls/hr


Sodium Chloride (Normal Saline)  1,000 mls @ 100 mls/hr IV ASDIRECTED [facility]


Magnesium Sulfate 2 gm/ Premix  50 mls @ 25 mls/hr IV ONETIME ONE


   Stop: 10/09/18 09:14


   Last Admin: 10/09/18 07:49 Dose:  25 mls/hr


Ibuprofen (Motrin)  600 mg PO Q6H PRN


   PRN Reason: Pain (mild 1-3)


Insulin Aspart (Novolog)  0 unit SUBCUT TIDAC [facility]; Protocol


   Last Admin: 10/09/18 12:56 Dose:  Not Given


Levothyroxine Sodium (Synthroid)  100 mcg PO ACBREAKFAST [facility]


   Last Admin: 10/09/18 06:36 Dose:  100 mcg


Omeprazole (Omeprazole)  20 mg PO ACBREAKFAST [facility]


   Last Admin: 10/06/18 06:33 Dose:  20 mg


Ondansetron HCl (Zofran Odt)  4 mg PO Q4H PRN


   PRN Reason: nausea, able to take PO


Ondansetron HCl (Zofran)  4 mg IVPUSH Q4H PRN


   PRN Reason: Nausea


Oxycodone HCl (Oxycodone)  5 mg PO Q4H PRN


   PRN Reason: Pain (moderate 4-6)


Pantoprazole Sodium (Protonix***)  40 mg PO ACBREAKFAST [facility]


   Last Admin: 10/09/18 06:36 Dose:  40 mg


Polyethylene Glycol (Miralax)  17 gm PO DAILY PRN


   PRN Reason: Constipation


   Last Admin: 10/04/18 19:43 Dose:  17 gm


Potassium Chloride (Klor-Con M20)  40 meq PO DAILY [facility]


Potassium Chloride (Klor-Con M20)  40 meq PO DAILY ONE


   Stop: 10/06/18 08:05


   Last Admin: 10/06/18 09:38 Dose:  40 meq


Potassium Chloride (Potassium Chloride)  40 meq PO Q4H [facility]


   Stop: 10/07/18 11:46


   Last Admin: 10/07/18 12:10 Dose:  40 meq


Potassium Chloride (Potassium Chloride)  40 meq PO ONETIME ONE


   Stop: 10/07/18 17:27


   Last Admin: 10/07/18 18:15 Dose:  40 meq


Potassium Chloride (Potassium Chloride)  40 meq PO ONETIME ONE


   Stop: 10/09/18 07:17


   Last Admin: 10/09/18 07:58 Dose:  40 meq


Potassium Chloride (Potassium Chloride)  40 meq PO ONETIME ONE


   Stop: 10/09/18 10:01


   Last Admin: 10/09/18 10:32 Dose:  40 meq


Prednisone (Prednisone)  5 mg PO DAILY [facility]


   Last Admin: 10/09/18 08:54 Dose:  5 mg


Temazepam (Restoril)  15 mg PO BEDTIME PRN


   PRN Reason: Sleep


Vancomycin HCl (First-Vancomycin 25 Compounding Kit)  125 mg PO QID [facility]


   Last Admin: 10/09/18 11:44 Dose:  5 ml


Warfarin Sodium (Coumadin)  7.5 mg PO TuThSa@1400 [facility]


   Last Admin: 10/06/18 15:02 Dose:  7.5 mg


Warfarin Sodium (Coumadin)  5 mg PO SuMoWeFr@1400 [facility]


   Last Admin: 10/05/18 14:34 Dose:  5 mg


Wound Care/Dressing Products (Duoderm Cgf)  1 each TOP ASDIRECTED PRN


   PRN Reason: bedsore


   Last Admin: 10/08/18 06:38 Dose:  1 each











- Free Text/Narrative


Note: 





I have examined the patient.  I have discussed findings and treatment plan with 

the resident.  I agree with the assessment and plan in the following resident's 

note.

## 2018-10-09 NOTE — CR
EXAM DATE: 10/05/18



PATIENT'S AGE: 63





Patient: FRANCESCA MARLEY



Facility: [place], [place]

Patient ID: [id number]

Site Patient ID: [id number].

Site Accession #: [id number]

: 1955

Study: XRay Chest [id number]/[id number] 8:40:06 PM

Ordering Physician: Eddie Kim



Final Report: 

INDICATION:

Dyspnea



TECHNIQUE:

Chest 1 view. 



COMPARISON:

None



FINDINGS:

Cardiovascular and mediastinum: Heart size and vasculature are normal in 
caliber and appearance. Mediastinum is within normal limits. Left-sided port 
catheter tip terminates in the SVC. 



Lungs and pleural space: Lungs are clear. No sign of infiltrate or mass. No 
sign of pleural effusion. No pneumothorax. 



Bones and soft tissues: Remote right 6th rib fracture 



IMPRESSION:

Unremarkable chest. No acute pulmonary or cardiac abnormalities.



Dictated by Daniel Domingo MD @ 10/8/2018 8:58:57 PM







Dictated by: Daniel Domingo MD @ 10/08/2018 20:59:03

(Electronic Signature)







Report Signed by Proxy.
RIVAS

## 2019-04-09 NOTE — PCM.HP
H&P History of Present Illness





- General


Date of Service: 02/27/18


Admit Problem/Dx: 


 Admission Diagnosis/Problem





Admission Diagnosis/Problem      Dehydration








Source of Information: Patient, Family


History Limitations: Reports: No Limitations





- History of Present Illness


Initial Comments - Free Text/Narative: 


63-year-old female presenting to emergency department with chief complaint of 

nausea and vomiting starting at 2 PM this afternoon with past medical history 

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 

hypertension, hypothyroidism, and pulmonary embolism on warfarin.





Patient states that at around 2 PM on day of admission she became nauseous with 

subsequent vomiting. She reported no oral take an felt dehydrated and slightly 

weak. She also states that she did have some mid upper abdominal pain which has 

somewhat improved. She still has some epigastric as well as right upper 

quadrant pain. She reports no hemoptysis or blood in her emesis. She denies any 

diarrhea, bloody stool, dark tarry stools, sore throat, fever, chills, or 

recent illness. She does have a chronic cough which has been attributed to her 

metastatic lung cancer and been investigated multiple times. 





Patient does have a history of stage IV metastatic small cell lung cancer for 

which she is receiving palliative chemotherapy. Her last chemotherapy was on 

Thursday of last week. She states that she has been tolerating the chemotherapy 

well with no associated nausea or vomiting. She also has a history of pulmonary 

embolism and is on warfarin currently.





Patient currently denies any chest pain, palpitations, shortness of breath, 

syncopal episodes, or focal neurologic episodes.





Emergency department:


CBC was unremarkable. INR was 2.25 which is sub-therapeutic for her DVT/PE 

prevention. CMP revealed elevated liver function tests with a total bilirubin 

of 1.6, AST 1195, , and alkaline phosphatase 379. Lipase and urinalysis 

were unremarkable. Chest/abdomen/pelvis acute series x-ray revealed no acute 

intra-abdominal processes with minimal linear atelectasis or scarring left lung 

most likely secondary to her metastatic lung cancer.





Patient was admitted for intractable vomiting and dehydration.





Onset of Symptoms: Reports: Today, Sudden


  ** abdominal


Pain Score (Numeric/FACES): 5





- Related Data


Allergies/Adverse Reactions: 


 Allergies











Allergy/AdvReac Type Severity Reaction Status Date / Time


 


latex Allergy  Cannot Verified 02/27/18 19:43





   Remember  











Home Medications: 


 Home Meds





Carvedilol 6.25 mg PO BID 01/09/17 [History]


Benzonatate 200 mg PO TID PRN 07/06/17 [History]


Levothyroxine Sodium 100 mcg PO DAILY 07/06/17 [History]


Magnesium Oxide 800 mg PO TID 07/06/17 [History]


Omeprazole 20 mg PO ACBREAKFAST 07/06/17 [History]


Multivitamins [Childrens Chewable Vitamin] 0 mg PO DAILY 02/27/18 [History]


Warfarin [Coumadin] 7.5 mg PO ASDIRECTED 02/27/18 [History]


Warfarin [Coumadin] 10 mg PO ASDIRECTED 02/27/18 [History]











Past Medical History


HEENT History: Reports: Impaired Vision


Other HEENT History: wears glasses sometimes


Cardiovascular History: Reports: Hypertension


Other Cardiovascular History: not currently taking medication


Respiratory History: Reports: Other (See Below)


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 

the brain


Gastrointestinal History: Reports: GERD


Genitourinary History: Reports: Other (See Below)


Other Genitourinary History: Renal failure


OB/GYN History: Reports: Pregnancy


Musculoskeletal History: Reports: Fracture


Other Musculoskeletal History: fx left ankle


Neurological History: Reports: Vertigo, Other (See Below)


Other Neuro History: dizziness; Lung mets to the brain


Psychiatric History: Reports: None


Endocrine/Metabolic History: Reports: Hypothyroidism


Hematologic History: Reports: None


Other Hematologic History: son states her blood has been slow to clot recently


Immunologic History: Reports: None


Oncologic (Cancer) History: Reports: Lung


Other Oncologic History: lung cancer is metastisized to brain


Dermatologic History: Reports: None





- Infectious Disease History


Infectious Disease History: Reports: Chicken Pox





- Past Surgical History


Head Surgeries/Procedures: Reports: None


HEENT Surgical History: Reports: None


Cardiovascular Surgical History: Reports: Other (See Below)


Other Cardiovascular Surgeries/Procedures: portacath


Respiratory Surgical History: Reports: None


Female  Surgical History: Reports: None


Musculoskeletal Surgical History: Reports: None


Oncologic Surgical History: Reports: Other (See Below)





Social & Family History





- Family History


Family Medical History: Noncontributory





- Tobacco Use


Smoking Status *Q: Former Smoker


Years of Tobacco use: 40


Packs/Tins Daily: 0.2


Used Tobacco, but Quit: Yes


Month Tobacco Last Used: 2years


Second Hand Smoke Exposure: No





- Caffeine Use


Caffeine Use: Reports: Soda





- Recreational Drug Use


Recreational Drug Use: No





- Living Situation & Occupation


Living situation: Reports: , with Family





H&P Review of Systems





- Review of Systems:


Review Of Systems: See Below


General: Reports: Weakness, Fatigue.  Denies: Fever, Chills, Malaise


HEENT: Denies: Headaches, Sore Throat


Pulmonary: Reports: Cough, Sputum.  Denies: Shortness of Breath, Wheezing, 

Pleuritic Chest Pain, Hemoptysis


Cardiovascular: Denies: Chest Pain, Palpitations, Edema


Gastrointestinal: Reports: Abdominal Pain, Nausea, Vomiting.  Denies: Black 

Stool, Bloody Stool, Constipation, Diarrhea


Genitourinary: Denies: Dysuria, Hematuria


Musculoskeletal: Denies: Neck Pain, Leg Pain


Skin: Denies: Cyanosis


Psychiatric: Denies: Confusion


Neurological: Denies: Confusion, Dizziness, Headache


Hematologic/Lymphatic: Denies: Anemia


Immunologic: Denies: Anaphylaxis





Exam





- Exam


Exam: See Below





- Vital Signs


Vital Signs: 


 Last Vital Signs











Temp  97.7 F   02/27/18 22:10


 


Pulse  82   02/27/18 22:10


 


Resp  20   02/27/18 22:10


 


BP  124/75   02/27/18 22:10


 


Pulse Ox  94 L  02/27/18 22:10











Weight: 155 kg





- Exam


Quality Assessment: DVT Prophylaxis


General: Alert, Oriented, Cooperative


HEENT: Conjunctiva Clear, EACs Clear, EOMI, Hearing Intact, Mucosa Moist & Pink

, Nares Patent, Normal Nasal Septum, Posterior Pharynx Clear, PERRLA


Neck: Supple, Trachea Midline, 2


Lungs: Normal Respiratory Effort, Decreased Breath Sounds


Cardiovascular: Regular Rate, Regular Rhythm


GI/Abdominal Exam: Normal Bowel Sounds, Soft, No Organomegaly, No Distention, 

Tender (RUQ and epigastric.)


 (Female) Exam: Deferred


Rectal (Female) Exam: Deferred


Back Exam: Normal Inspection, Full Range of Motion, NT


Extremities: Normal Inspection, Non-Tender, No Pedal Edema, Normal Capillary 

Refill


Peripheral Pulses: 2+: Radial (L), Radial (R), Posterior Tibial (L), Posterior 

Tibial (R), Dorsalis Pedis (L), Dorsalis Pedis (R)


Skin: Warm, Dry, Intact


Neurological: Cranial Nerves Intact


Neuro Extensive - Mental Status: Alert, Oriented x3, Normal Mood/Affect, Normal 

Cognition


Neuro Extensive - Motor, Sensory, Reflexes: CN II-XII Intact


Psychiatric: Alert, Normal Affect, Normal Mood





- Patient Data


Lab Results Last 24 hrs: 


 Laboratory Results - last 24 hr











  02/27/18 Range/Units





  21:35 


 


Urine Color  YELLOW  


 


Urine Appearance  SLT CLOUDY  


 


Urine pH  6.5  (5.0-8.0)  


 


Ur Specific Gravity  1.010  (1.001-1.035)  


 


Urine Protein  NEGATIVE  (NEGATIVE)  mg/dL


 


Urine Glucose (UA)  NEGATIVE  (NEGATIVE)  mg/dL


 


Urine Ketones  NEGATIVE  (NEGATIVE)  mg/dL


 


Urine Occult Blood  NEGATIVE  (NEGATIVE)  


 


Urine Nitrite  NEGATIVE  (NEGATIVE)  


 


Urine Bilirubin  NEGATIVE  (NEGATIVE)  


 


Urine Urobilinogen  0.2  (<2.0)  EU/dL


 


Ur Leukocyte Esterase  NEGATIVE  (NEGATIVE)  


 


Urine RBC  1-2  (0-2/HPF)  


 


Urine WBC  0-2  (0-5/HPF)  


 


Ur Epithelial Cells  FEW  (NONE-FEW)  


 


Amorphous Sediment  LIGHT  (NEGATIVE)  


 


Urine Bacteria  FEW  (NEGATIVE)  











Result Diagrams: 


 02/27/18 19:55





 02/27/18 19:55





*Q Meaningful Use (ADM)





- VTE *Q


VTE Criteria *Q: 








- Stroke *Q


Stroke Criteria *Q: 








- AMI *Q


AMI Criteria *Q: 








- Problem List


(1) Intractable vomiting with nausea


SNOMED Code(s): 432316397


   ICD Code: R11.2 - NAUSEA WITH VOMITING, UNSPECIFIED   Status: Acute   

Priority: High   Current Visit: Yes   


Qualifiers: 


   Vomiting type: unspecified   Qualified Code(s): R11.2 - Nausea with vomiting

, unspecified   





(2) Subtherapeutic anticoagulation


SNOMED Code(s): 62159503


   ICD Code: Z51.81 - ENCOUNTER FOR THERAPEUTIC DRUG LEVEL MONITORING; Z79.01 - 

LONG TERM (CURRENT) USE OF ANTICOAGULANTS   Status: Acute   Priority: High   

Current Visit: Yes   





(3) Elevated LFTs


SNOMED Code(s): 363797310


   ICD Code: R79.89 - OTHER SPECIFIED ABNORMAL FINDINGS OF BLOOD CHEMISTRY   

Status: Acute   Priority: High   Current Visit: Yes   





(4) Primary small cell malignant neoplasm of lung, stage 4


SNOMED Code(s): 99388357959590


   ICD Code: C34.90 - MALIGNANT NEOPLASM OF UNSP PART OF UNSP BRONCHUS OR LUNG 

  Status: Chronic   Priority: Medium   Current Visit: Yes   





(5) Dehydration


SNOMED Code(s): 79708254


   ICD Code: E86.0 - DEHYDRATION   Status: Acute   Priority: High   Current 

Visit: Yes   





(6) Hx pulmonary embolism


SNOMED Code(s): 328236483


   ICD Code: Z86.711 - PERSONAL HISTORY OF PULMONARY EMBOLISM   Status: Chronic

   Priority: Medium   Current Visit: Yes   


Problem List Initiated/Reviewed/Updated: Yes


Orders Last 24hrs: 


 Medication Orders





Sodium Chloride (Saline Flush)  10 ml FLUSH ASDIRECTED PRN


   PRN Reason: Keep Vein Open


Sodium Chloride (Saline Flush)  2.5 ml FLUSH ASDIRECTED PRN


   PRN Reason: Keep Vein Open








Assessment/Plan Comment:: 


63-year-old female admitted 2/28/18 for intractable vomiting and dehydration 

found to have elevated liver function tests and subtherapeutic INR with past 

medical history of stage IV metastatic small cell lung cancer, hypertension, 

hypothyroidism, and PE on warfarin.





Intractable vomiting/dehydration:  Currently improved with Zofran. We'll 

continue Zofran when necessary. We'll IV fluid resuscitate at this time and 

continue to monitor closely.





Elevated liver function tests: Linear records these are acutely elevated as she 

had labs drawn on 2/21/18 that were basically normal. She does report some 

epigastric as well as right upper artery pain. We'll get a CT of the abdomen 

and pelvis further investigate. I did talk with the patient about what her 

desire would be if she needed possible surgery. At this point she is unclear if 

she would want a surgery and it would have to depend none what the results 

were.. This could be secondary to her metastatic lung cancer however with his 

acute arise I suspect that another processes involved. We'll continue to 

monitor closely.





Subtherapeutic INR: Patient's INR is subtherapeutic for her history of DVT/PE. 

Will adjust Coumadin dosing as per pharmacy and monitor daily.





Hypertension: Currently controlled we'll restart home medications.





Hypothyroidism: We'll get TSH and restart home medication and adjust as needed.





VTE proph: SCD, Coumadin





Dispo: 1-2 days pending.
Breast cancer, female